# Patient Record
Sex: FEMALE | Race: BLACK OR AFRICAN AMERICAN | URBAN - METROPOLITAN AREA
[De-identification: names, ages, dates, MRNs, and addresses within clinical notes are randomized per-mention and may not be internally consistent; named-entity substitution may affect disease eponyms.]

---

## 2017-01-04 ENCOUNTER — OFFICE VISIT (OUTPATIENT)
Dept: FAMILY MEDICINE CLINIC | Age: 32
End: 2017-01-04

## 2017-01-04 VITALS
SYSTOLIC BLOOD PRESSURE: 110 MMHG | DIASTOLIC BLOOD PRESSURE: 76 MMHG | HEIGHT: 69 IN | HEART RATE: 80 BPM | TEMPERATURE: 97.8 F | WEIGHT: 293 LBS | OXYGEN SATURATION: 97 % | BODY MASS INDEX: 43.4 KG/M2 | RESPIRATION RATE: 12 BRPM

## 2017-01-04 DIAGNOSIS — Z11.1 SCREENING FOR TUBERCULOSIS: ICD-10-CM

## 2017-01-04 DIAGNOSIS — N89.8 VAGINAL DISCHARGE: Primary | ICD-10-CM

## 2017-01-04 DIAGNOSIS — R35.0 URINARY FREQUENCY: ICD-10-CM

## 2017-01-04 PROBLEM — F90.9 ATTENTION DEFICIT HYPERACTIVITY DISORDER (ADHD): Status: ACTIVE | Noted: 2017-01-04

## 2017-01-04 PROBLEM — J45.20 MILD INTERMITTENT ASTHMA WITHOUT COMPLICATION: Status: ACTIVE | Noted: 2017-01-04

## 2017-01-04 PROBLEM — B97.7 HPV IN FEMALE: Status: ACTIVE | Noted: 2017-01-04

## 2017-01-04 PROBLEM — F79 INTELLECTUAL DISABILITY: Status: ACTIVE | Noted: 2017-01-04

## 2017-01-04 LAB
BILIRUB UR QL STRIP: NEGATIVE
GLUCOSE UR-MCNC: NEGATIVE MG/DL
KETONES P FAST UR STRIP-MCNC: NEGATIVE MG/DL
PH UR STRIP: 7 [PH] (ref 4.6–8)
PROT UR QL STRIP: NEGATIVE MG/DL
SP GR UR STRIP: 1.01 (ref 1–1.03)
UA UROBILINOGEN AMB POC: NORMAL (ref 0.2–1)
URINALYSIS CLARITY POC: CLEAR
URINALYSIS COLOR POC: YELLOW
URINE BLOOD POC: NEGATIVE
URINE LEUKOCYTES POC: NEGATIVE
URINE NITRITES POC: NEGATIVE

## 2017-01-04 RX ORDER — CITALOPRAM 40 MG/1
40 TABLET, FILM COATED ORAL DAILY
COMMUNITY

## 2017-01-04 RX ORDER — RISPERIDONE 2 MG/1
TABLET, FILM COATED ORAL
COMMUNITY

## 2017-01-04 RX ORDER — PSEUDOEPHEDRINE HCL 30 MG
TABLET ORAL
COMMUNITY
End: 2017-01-26 | Stop reason: ALTCHOICE

## 2017-01-04 RX ORDER — ACYCLOVIR 400 MG/1
400 TABLET ORAL
COMMUNITY

## 2017-01-04 RX ORDER — ADHESIVE BANDAGE
30 BANDAGE TOPICAL DAILY PRN
COMMUNITY

## 2017-01-04 RX ORDER — POLYETHYLENE GLYCOL 3350
POWDER (GRAM) MISCELLANEOUS
COMMUNITY
End: 2019-09-30 | Stop reason: SDUPTHER

## 2017-01-04 RX ORDER — ONDANSETRON 4 MG/1
4 TABLET, ORALLY DISINTEGRATING ORAL
COMMUNITY

## 2017-01-04 RX ORDER — ACETAMINOPHEN 325 MG/1
TABLET ORAL
COMMUNITY

## 2017-01-04 RX ORDER — DOCUSATE SODIUM 100 MG/1
100 CAPSULE, LIQUID FILLED ORAL 2 TIMES DAILY
COMMUNITY
End: 2018-08-09 | Stop reason: SDUPTHER

## 2017-01-04 NOTE — PROGRESS NOTES
Chief Complaint   Patient presents with   [address] [facility] [address] Injection     she is a 32y.o. year old female who presents for evalution. Pt new to group home. Needs PPD. No coughing, night sweats, weight loss. Having urinary frequency and discharge, vaginal odor. 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 Reviewed PmHx, RxHx, FmHx, SocHx, AllgHx and updated and dated in the chart. Review of Systems - negative except as listed above in the HPI    Objective:     Vitals:    01/04/17 1547   BP: 110/76   Pulse: 80   Resp: 12   Temp: 97.8 °F (36.6 °C)   SpO2: 97%   Weight: 303 lb (137.4 kg)   Height: 5' 9\" (1.753 m)     Physical Examination: General appearance - alert, well appearing, and in no distress  Chest - clear to auscultation, no wheezes, rales or rhonchi, symmetric air entry  Heart - normal rate, regular rhythm, normal S1, S2, no murmurs, rubs, clicks or gallops  Pelvic - VULVA: normal appearing vulva with no masses, tenderness or lesions, VAGINA: normal appearing vagina with normal color and discharge, no lesions, malodor present  External exam only     Assessment/ Plan:   Corby Guaman was seen today for establish care and injection. Diagnoses and all orders for this visit:    Vaginal discharge  -     Cherie Debra  Will decide on F/U after reviewing labs. Urinary frequency  -     CULTURE, URINE  -     AMB POC URINALYSIS DIP STICK AUTO W/ MICRO  No signs of infection on dipstick, will send out for culture. Screening for tuberculosis  -     AMB POC TUBERCULOSIS, INTRADERMAL (SKIN TEST)    Pt voiced understanding regarding plan of care. Follow-up Disposition:  Return in about 2 days (around 1/6/2017) for PPD reading . I have discussed the diagnosis with the patient and the intended plan as seen in the above orders. The patient has received an after-visit summary and questions were answered concerning future plans.      Medication Side Effects and Warnings were discussed with patient    Yoly Fischer NP

## 2017-01-04 NOTE — MR AVS SNAPSHOT
Visit Information Date & Time Provider Department Dept. Phone Encounter #  
 1/4/2017  3:30 PM Arnulfo Gastelum NP [address] [facility] [phone] [id number] Follow-up Instructions Return in about 2 days (around 1/6/2017) for PPD reading . Upcoming Health Maintenance Date Due DTaP/Tdap/Td series (1 - Tdap) 4/28/2006 PAP AKA CERVICAL CYTOLOGY 4/28/2006 INFLUENZA AGE 9 TO ADULT 8/1/2016 Allergies as of 1/4/2017  Review Complete On: 1/4/2017 By: Daisy Taveras LPN Severity Noted Reaction Type Reactions Tomato  01/04/2017    Rash Per pt report Current Immunizations  Never Reviewed Name Date  
 TB Skin Test (PPD) Intradermal  Incomplete Not reviewed this visit You Were Diagnosed With   
  
 Codes Comments Vaginal discharge    -  Primary ICD-10-CM: N89.8 ICD-9-CM: 623.5 Urinary frequency     ICD-10-CM: R35.0 ICD-9-CM: 788.41 Screening for tuberculosis     ICD-10-CM: Z11.1 ICD-9-CM: V74.1 Vitals BP Pulse Temp Resp Height(growth percentile) Weight(growth percentile) 110/76 80 97.8 °F (36.6 °C) 12 5' 9\" (1.753 m) 303 lb (137.4 kg) SpO2 BMI OB Status Smoking Status 97% 44.75 kg/m2 Unknown Never Smoker Vitals History BMI and BSA Data Body Mass Index Body Surface Area 44.75 kg/m 2 2.59 m 2 Preferred Pharmacy Pharmacy Name Phone [facility] [phone] Your Updated Medication List  
  
   
This list is accurate as of: 1/4/17  4:16 PM.  Always use your most recent med list.  
  
  
  
  
 acyclovir 400 mg tablet Commonly known as:  ZOVIRAX Take 400 mg by mouth every four (4) hours (while awake). citalopram 40 mg tablet Commonly known as:  Milton Mao Take 40 mg by mouth daily. COLACE 100 mg capsule Generic drug:  docusate sodium Take 100 mg by mouth two (2) times a day.   
  
 IMODIUM PO  
 Take  by mouth.  
  
 loratadine 10 mg Cap Take  by mouth. norethindrone-ethinyl estradiol 1-35 mg-mcg Tab Commonly known as:  ORTHO-NOVUM 1-35 TAB, NORTREL 1-35 TAB Take  by mouth. ondansetron 4 mg disintegrating tablet Commonly known as:  ZOFRAN ODT Take 4 mg by mouth every eight (8) hours as needed for Nausea. HIGUERA MILK OF MAGNESIA 400 mg/5 mL suspension Generic drug:  magnesium hydroxide Take 30 mL by mouth daily as needed for Constipation. Polyethylene Glycol 3350 Powd  
by Does Not Apply route. RisperDAL 2 mg tablet Generic drug:  risperiDONE Take  by mouth. ROBITUSSIN COLD & COUGH PO Take  by mouth. SUDAFED 30 mg tablet Generic drug:  pseudoephedrine Take  by mouth every four (4) hours as needed for Congestion. THERAFLU COLD & SORE THROAT PO Take  by mouth. TYLENOL 325 mg tablet Generic drug:  acetaminophen Take  by mouth every four (4) hours as needed for Pain. Follow-up Instructions Return in about 2 days (around 1/6/2017) for PPD reading . Introducing [facility] & HEALTH SERVICES! [facility] introduces ExtraFootie patient portal. Now you can access parts of your medical record, email your doctor's office, and request medication refills online. 1. In your internet browser, go to [email or web address] 2. Click on the First Time User? Click Here link in the Sign In box. You will see the New Member Sign Up page. 3. Enter your ExtraFootie Access Code exactly as it appears below. You will not need to use this code after youve completed the sign-up process. If you do not sign up before the expiration date, you must request a new code. · ExtraFootie Access Code: [email or web address] Expires: 4/4/2017  4:16 PM 
 
4. Enter the last four digits of your Social Security Number (xxxx) and Date of Birth (mm/dd/yyyy) as indicated and click Submit. You will be taken to the next sign-up page. 5. Create a Sifteo ID. This will be your Sifteo login ID and cannot be changed, so think of one that is secure and easy to remember. 6. Create a Sifteo password. You can change your password at any time. 7. Enter your Password Reset Question and Answer. This can be used at a later time if you forget your password. 8. Enter your e-mail address. You will receive e-mail notification when new information is available in [address] 9. Click Sign Up. You can now view and download portions of your medical record. 10. Click the Download Summary menu link to download a portable copy of your medical information. If you have questions, please visit the Frequently Asked Questions section of the Sifteo website. Remember, Sifteo is NOT to be used for urgent needs. For medical emergencies, dial 911. Now available from your iPhone and Android! Please provide this summary of care documentation to your next provider. If you have any questions after today's visit, please call [phone].

## 2017-01-06 LAB
A VAGINAE DNA VAG QL NAA+PROBE: NORMAL SCORE
BACTERIA UR CULT: NORMAL
BVAB2 DNA VAG QL NAA+PROBE: NORMAL SCORE
C ALBICANS DNA VAG QL NAA+PROBE: NEGATIVE
C GLABRATA DNA VAG QL NAA+PROBE: NEGATIVE
MEGA1 DNA VAG QL NAA+PROBE: NORMAL SCORE
MM INDURATION POC: 0 MM (ref 0–5)
PPD POC: NEGATIVE NEGATIVE
T VAGINALIS RRNA SPEC QL NAA+PROBE: NEGATIVE

## 2017-01-26 ENCOUNTER — OFFICE VISIT (OUTPATIENT)
Dept: FAMILY MEDICINE CLINIC | Age: 32
End: 2017-01-26

## 2017-01-26 VITALS
BODY MASS INDEX: 43.4 KG/M2 | OXYGEN SATURATION: 100 % | RESPIRATION RATE: 16 BRPM | TEMPERATURE: 98 F | DIASTOLIC BLOOD PRESSURE: 66 MMHG | HEART RATE: 68 BPM | WEIGHT: 293 LBS | SYSTOLIC BLOOD PRESSURE: 102 MMHG | HEIGHT: 69 IN

## 2017-01-26 DIAGNOSIS — R30.0 DYSURIA: ICD-10-CM

## 2017-01-26 DIAGNOSIS — R63.5 WEIGHT GAIN: ICD-10-CM

## 2017-01-26 DIAGNOSIS — F90.9 ATTENTION DEFICIT HYPERACTIVITY DISORDER (ADHD), UNSPECIFIED ADHD TYPE: ICD-10-CM

## 2017-01-26 DIAGNOSIS — Z51.81 MEDICATION MONITORING ENCOUNTER: ICD-10-CM

## 2017-01-26 DIAGNOSIS — F79 INTELLECTUAL DISABILITY: Primary | ICD-10-CM

## 2017-01-26 DIAGNOSIS — Z11.1 SCREENING-PULMONARY TB: ICD-10-CM

## 2017-01-26 DIAGNOSIS — E78.00 HYPERCHOLESTEREMIA: ICD-10-CM

## 2017-01-26 LAB
BILIRUB UR QL STRIP: NEGATIVE
GLUCOSE UR-MCNC: NEGATIVE MG/DL
KETONES P FAST UR STRIP-MCNC: NEGATIVE MG/DL
PH UR STRIP: 6 [PH] (ref 4.6–8)
PROT UR QL STRIP: NEGATIVE MG/DL
SP GR UR STRIP: 1.01 (ref 1–1.03)
UA UROBILINOGEN AMB POC: NORMAL (ref 0.2–1)
URINALYSIS CLARITY POC: CLEAR
URINALYSIS COLOR POC: YELLOW
URINE BLOOD POC: NEGATIVE
URINE LEUKOCYTES POC: NEGATIVE
URINE NITRITES POC: NEGATIVE

## 2017-01-26 RX ORDER — FLUTICASONE PROPIONATE 50 MCG
2 SPRAY, SUSPENSION (ML) NASAL DAILY
COMMUNITY
End: 2018-09-13 | Stop reason: SDUPTHER

## 2017-01-26 NOTE — PROGRESS NOTES
1. Have you been to the ER, urgent care clinic since your last visit? Hospitalized since your last visit? No    2. Have you seen or consulted any other health care providers outside of the [address] [place] [address] since your last visit? Include any pap smears or colon screening. No    Chief Complaint   Patient presents with    Complete Physical    Labs     fasting    Lesion     left hip x 1 mo     Pt states she has a lesion on left hip x 1 month.

## 2017-01-26 NOTE — MR AVS SNAPSHOT
Visit Information Date & Time Provider Department Dept. Phone Encounter #  
 1/26/2017 10:00 AM Eva Hannah, TAM [address] [facility] [phone] [id number] Upcoming Health Maintenance Date Due Pneumococcal 19-64 Medium Risk (1 of 1 - PPSV23) 4/28/2004 DTaP/Tdap/Td series (1 - Tdap) 4/28/2006 PAP AKA CERVICAL CYTOLOGY 4/28/2006 INFLUENZA AGE 9 TO ADULT 8/1/2016 Allergies as of 1/26/2017  Review Complete On: 1/26/2017 By: Tamiko Sue LPN Severity Noted Reaction Type Reactions Tomato  01/04/2017    Rash Per pt report Current Immunizations  Reviewed on 1/4/2017 Name Date  
 TB Skin Test (PPD) Intradermal 1/4/2017 Not reviewed this visit You Were Diagnosed With   
  
 Codes Comments Intellectual disability    -  Primary ICD-10-CM: F79 
ICD-9-CM: 372 Attention deficit hyperactivity disorder (ADHD), unspecified ADHD type     ICD-10-CM: F90.9 ICD-9-CM: 314.01 Weight gain     ICD-10-CM: R63.5 ICD-9-CM: 783.1 Dysuria     ICD-10-CM: R30.0 ICD-9-CM: 788.1 Medication monitoring encounter     ICD-10-CM: Z51.81 
ICD-9-CM: V58.83 Hypercholesteremia     ICD-10-CM: E78.00 ICD-9-CM: 272.0 Screening-pulmonary TB     ICD-10-CM: Z11.1 ICD-9-CM: V74.1 Vitals BP Pulse Temp Resp Height(growth percentile) Weight(growth percentile) 102/66 68 98 °F (36.7 °C) (Oral) 16 5' 9\" (1.753 m) 306 lb 4 oz (138.9 kg) SpO2 BMI OB Status Smoking Status 100% 45.23 kg/m2 Unknown Never Smoker Vitals History BMI and BSA Data Body Mass Index Body Surface Area  
 45.23 kg/m 2 2.6 m 2 Preferred Pharmacy Pharmacy Name Phone [facility] [phone] Your Updated Medication List  
  
   
This list is accurate as of: 1/26/17 10:48 AM.  Always use your most recent med list.  
  
  
  
  
 acyclovir 400 mg tablet Commonly known as:  ZOVIRAX Take 400 mg by mouth every four (4) hours (while awake). citalopram 40 mg tablet Commonly known as:  Valeria Doan Take 40 mg by mouth daily. COLACE 100 mg capsule Generic drug:  docusate sodium Take 100 mg by mouth two (2) times a day. fluticasone 50 mcg/actuation nasal spray Commonly known as:  Shelvia Birks 2 Sprays by Both Nostrils route daily. loratadine 10 mg Cap Take  by mouth. norethindrone-ethinyl estradiol 1-35 mg-mcg Tab Commonly known as:  ORTHO-NOVUM 1-35 TAB, NORTREL 1-35 TAB Take  by mouth. ondansetron 4 mg disintegrating tablet Commonly known as:  ZOFRAN ODT Take 4 mg by mouth every eight (8) hours as needed for Nausea. HIGUERA MILK OF MAGNESIA 400 mg/5 mL suspension Generic drug:  magnesium hydroxide Take 30 mL by mouth daily as needed for Constipation. Polyethylene Glycol 3350 Powd  
by Does Not Apply route. RisperDAL 2 mg tablet Generic drug:  risperiDONE Take  by mouth. TYLENOL 325 mg tablet Generic drug:  acetaminophen Take  by mouth every four (4) hours as needed for Pain. We Performed the Following AMB POC URINALYSIS DIP STICK AUTO W/O MICRO [41903 CPT(R)] CBC WITH AUTOMATED DIFF [80947 CPT(R)] HEMOGLOBIN A1C WITH EAG [75505 CPT(R)] LIPID PANEL [29623 CPT(R)] METABOLIC PANEL, COMPREHENSIVE [73635 CPT(R)] QUANTIFERON TB GOLD [LKC79034 Custom] TSH 3RD GENERATION [57320 CPT(R)] Introducing [facility] & HE[facility] SERVICES! Danny Leiva introduces C-Note patient portal. Now you can access parts of your medical record, email your doctor's office, and request medication refills online. 1. In your internet browser, go to [email or web address] 2. Click on the First Time User? Click Here link in the Sign In box. You will see the New Member Sign Up page. 3. Enter your C-Note Access Code exactly as it appears below.  You will not need to use this code after youve completed the sign-up process. If you do not sign up before the expiration date, you must request a new code. · Voxbone Access Code: [email or web address] Expires: 4/4/2017  4:16 PM 
 
4. Enter the last four digits of your Social Security Number (xxxx) and Date of Birth (mm/dd/yyyy) as indicated and click Submit. You will be taken to the next sign-up page. 5. Create a Voxbone ID. This will be your Voxbone login ID and cannot be changed, so think of one that is secure and easy to remember. 6. Create a Voxbone password. You can change your password at any time. 7. Enter your Password Reset Question and Answer. This can be used at a later time if you forget your password. 8. Enter your e-mail address. You will receive e-mail notification when new information is available in [address] 9. Click Sign Up. You can now view and download portions of your medical record. 10. Click the Download Summary menu link to download a portable copy of your medical information. If you have questions, please visit the Frequently Asked Questions section of the Voxbone website. Remember, Voxbone is NOT to be used for urgent needs. For medical emergencies, dial 911. Now available from your iPhone and Android! Please provide this summary of care documentation to your next provider. Your primary care clinician is listed as [address]. If you have any questions after today's visit, please call [phone].

## 2017-01-26 NOTE — PROGRESS NOTES
HISTORY OF PRESENT ILLNESS  Mic Javier is a 32 y.o. female. HPI  Patient here for group home annual ppd screening  Concerned that her sugar may be high, freq urination without burning and obese  She is following healthy diet at the group home but sneaking snacks  No refills needed at this time  Has dry skin patch of the left hip area that itches    ROS  A comprehensive review of system was obtained and negative except findings in the HPI    Visit Vitals    /66    Pulse 68    Temp 98 °F (36.7 °C) (Oral)    Resp 16    Ht 5' 9\" (1.753 m)    Wt 306 lb 4 oz (138.9 kg)    SpO2 100%    BMI 45.23 kg/m2     Physical Exam   Constitutional: She is oriented to person, place, and time. She appears well-developed and well-nourished. Neck: No JVD present. Cardiovascular: Normal rate, regular rhythm and intact distal pulses. Exam reveals no gallop and no friction rub. No murmur heard. Pulmonary/Chest: Effort normal and breath sounds normal. No respiratory distress. She has no wheezes. Musculoskeletal: She exhibits no edema. Neurological: She is alert and oriented to person, place, and time. Skin: Skin is warm. Nursing note and vitals reviewed. ASSESSMENT and PLAN  Encounter Diagnoses   Name Primary?  Intellectual disability Yes    Attention deficit hyperactivity disorder (ADHD), unspecified ADHD type     Weight gain     Dysuria     Medication monitoring encounter     Hypercholesteremia     Screening-pulmonary TB      Orders Placed This Encounter    QUANTIFERON TB GOLD    CBC WITH AUTOMATED DIFF    HEMOGLOBIN A1C WITH EAG    LIPID PANEL    METABOLIC PANEL, COMPREHENSIVE    TSH 3RD GENERATION    AMB POC URINALYSIS DIP STICK AUTO W/O MICRO    fluticasone (FLONASE) 50 mcg/actuation nasal spray     Urine dip clean  No change in meds at this time  Labs updated as well  I have discussed the diagnosis with the patient and the intended plan as seen in the above orders.  The patient has received an after-visit summary and questions were answered concerning future plans. Patient conveyed understanding of the plan at the time of the visit.     Yola Whitaker MSN, ANP  1/26/2017

## 2017-01-27 LAB
ALBUMIN SERPL-MCNC: 3.4 G/DL (ref 3.5–5.5)
ALBUMIN/GLOB SERPL: 1.1 {RATIO} (ref 1.1–2.5)
ALP SERPL-CCNC: 73 IU/L (ref 39–117)
ALT SERPL-CCNC: 13 IU/L (ref 0–32)
AST SERPL-CCNC: 19 IU/L (ref 0–40)
BASOPHILS # BLD AUTO: 0 X10E3/UL (ref 0–0.2)
BASOPHILS NFR BLD AUTO: 0 %
BILIRUB SERPL-MCNC: <0.2 MG/DL (ref 0–1.2)
BUN SERPL-MCNC: 13 MG/DL (ref 6–20)
BUN/CREAT SERPL: 16 (ref 8–20)
CALCIUM SERPL-MCNC: 8.8 MG/DL (ref 8.7–10.2)
CHLORIDE SERPL-SCNC: 101 MMOL/L (ref 96–106)
CHOLEST SERPL-MCNC: 161 MG/DL (ref 100–199)
CO2 SERPL-SCNC: 26 MMOL/L (ref 18–29)
CREAT SERPL-MCNC: 0.83 MG/DL (ref 0.57–1)
EOSINOPHIL # BLD AUTO: 0.2 X10E3/UL (ref 0–0.4)
EOSINOPHIL NFR BLD AUTO: 2 %
ERYTHROCYTE [DISTWIDTH] IN BLOOD BY AUTOMATED COUNT: 14.1 % (ref 12.3–15.4)
EST. AVERAGE GLUCOSE BLD GHB EST-MCNC: 114 MG/DL
GLOBULIN SER CALC-MCNC: 3.1 G/DL (ref 1.5–4.5)
GLUCOSE SERPL-MCNC: 69 MG/DL (ref 65–99)
HBA1C MFR BLD: 5.6 % (ref 4.8–5.6)
HCT VFR BLD AUTO: 35.7 % (ref 34–46.6)
HDLC SERPL-MCNC: 66 MG/DL
HGB BLD-MCNC: 11.7 G/DL (ref 11.1–15.9)
IMM GRANULOCYTES # BLD: 0 X10E3/UL (ref 0–0.1)
IMM GRANULOCYTES NFR BLD: 0 %
INTERPRETATION, 910389: NORMAL
LDLC SERPL CALC-MCNC: 81 MG/DL (ref 0–99)
LYMPHOCYTES # BLD AUTO: 2.7 X10E3/UL (ref 0.7–3.1)
LYMPHOCYTES NFR BLD AUTO: 26 %
MCH RBC QN AUTO: 28.9 PG (ref 26.6–33)
MCHC RBC AUTO-ENTMCNC: 32.8 G/DL (ref 31.5–35.7)
MCV RBC AUTO: 88 FL (ref 79–97)
MONOCYTES # BLD AUTO: 0.8 X10E3/UL (ref 0.1–0.9)
MONOCYTES NFR BLD AUTO: 8 %
NEUTROPHILS # BLD AUTO: 6.6 X10E3/UL (ref 1.4–7)
NEUTROPHILS NFR BLD AUTO: 64 %
PLATELET # BLD AUTO: 289 X10E3/UL (ref 150–379)
POTASSIUM SERPL-SCNC: 4.3 MMOL/L (ref 3.5–5.2)
PROT SERPL-MCNC: 6.5 G/DL (ref 6–8.5)
RBC # BLD AUTO: 4.05 X10E6/UL (ref 3.77–5.28)
SODIUM SERPL-SCNC: 137 MMOL/L (ref 134–144)
TRIGL SERPL-MCNC: 72 MG/DL (ref 0–149)
TSH SERPL DL<=0.005 MIU/L-ACNC: 2.67 UIU/ML (ref 0.45–4.5)
VLDLC SERPL CALC-MCNC: 14 MG/DL (ref 5–40)
WBC # BLD AUTO: 10.3 X10E3/UL (ref 3.4–10.8)

## 2017-01-27 NOTE — PROGRESS NOTES
RECOMMENDATIONS:  None. Keep up the good work! Work on diet and exercise. Recheck this test: 12 months.

## 2017-01-31 LAB
ANNOTATION COMMENT IMP: NORMAL
GAMMA INTERFERON BACKGROUND BLD IA-ACNC: 0.08 IU/ML
M TB IFN-G BLD-IMP: NEGATIVE
M TB IFN-G CD4+ BCKGRND COR BLD-ACNC: 0.23 IU/ML
M TB IFN-G CD4+ T-CELLS BLD-ACNC: 0.31 IU/ML
MITOGEN IGNF BLD-ACNC: >10 IU/ML
QUANTIFERON INCUBATION: NORMAL
SERVICE CMNT-IMP: NORMAL

## 2017-02-09 ENCOUNTER — OFFICE VISIT (OUTPATIENT)
Dept: FAMILY MEDICINE CLINIC | Age: 32
End: 2017-02-09

## 2017-02-09 VITALS
DIASTOLIC BLOOD PRESSURE: 78 MMHG | BODY MASS INDEX: 43.4 KG/M2 | HEART RATE: 65 BPM | WEIGHT: 293 LBS | OXYGEN SATURATION: 98 % | HEIGHT: 69 IN | TEMPERATURE: 98.1 F | SYSTOLIC BLOOD PRESSURE: 124 MMHG | RESPIRATION RATE: 18 BRPM

## 2017-02-09 DIAGNOSIS — B37.2 YEAST INFECTION OF THE SKIN: ICD-10-CM

## 2017-02-09 DIAGNOSIS — S09.90XA HEAD INJURY DUE TO TRAUMA, INITIAL ENCOUNTER: Primary | ICD-10-CM

## 2017-02-09 RX ORDER — IBUPROFEN 600 MG/1
600 TABLET ORAL
Qty: 30 TAB | Refills: 1 | Status: SHIPPED | OUTPATIENT
Start: 2017-02-09 | End: 2018-03-08 | Stop reason: SDUPTHER

## 2017-02-09 RX ORDER — NYSTATIN 100000 [USP'U]/G
POWDER TOPICAL
Qty: 60 G | Refills: 3 | Status: SHIPPED | OUTPATIENT
Start: 2017-02-09 | End: 2019-02-18 | Stop reason: ALTCHOICE
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
## 2017-04-25 ENCOUNTER — OFFICE VISIT (OUTPATIENT)
Dept: FAMILY MEDICINE CLINIC | Age: 32
End: 2017-04-25

## 2017-04-25 VITALS
WEIGHT: 293 LBS | BODY MASS INDEX: 43.4 KG/M2 | HEART RATE: 80 BPM | DIASTOLIC BLOOD PRESSURE: 80 MMHG | TEMPERATURE: 98.2 F | HEIGHT: 69 IN | RESPIRATION RATE: 18 BRPM | OXYGEN SATURATION: 99 % | SYSTOLIC BLOOD PRESSURE: 125 MMHG

## 2017-04-25 DIAGNOSIS — B37.2 YEAST INFECTION OF THE SKIN: Primary | ICD-10-CM

## 2017-04-25 RX ORDER — FLUCONAZOLE 150 MG/1
150 TABLET ORAL DAILY
Qty: 2 TAB | Refills: 0 | Status: SHIPPED | OUTPATIENT
Start: 2017-04-25 | End: 2017-05-25 | Stop reason: ALTCHOICE

## 2017-04-25 NOTE — PROGRESS NOTES
Pt here with group home counselors c/c rash on lower abdomen x 1 week. Pt reports having itching associated with rash. Counselor states that pt has been given Ibuprofen and Acyclovir. Subjective: (As above and below)     Chief Complaint   Patient presents with    Rash     she is a 32y.o. year old female who presents for evaluation. Reviewed PmHx, RxHx, FmHx, SocHx, AllgHx and updated in chart. Review of Systems - negative except as listed above    Objective:     Vitals:    04/25/17 1335   BP: 125/80   Pulse: 80   Resp: 18   Temp: 98.2 °F (36.8 °C)   TempSrc: Oral   SpO2: 99%   Weight: 325 lb (147.4 kg)   Height: 5' 9\" (1.753 m)     Physical Examination: General appearance - alert, well appearing, and in no distress  Mental status - normal mood, behavior, speech, dress, motor activity, and thought processes  Mouth - mucous membranes moist, pharynx normal without lesions  Chest - clear to auscultation, no wheezes, rales or rhonchi, symmetric air entry  Heart - normal rate, regular rhythm, normal S1, S2, no murmurs, rubs, clicks or gallops  Skin - shiny erythematous skin under lower abdominal fold, yeast odor    Assessment/ Plan:   1. Yeast infection of the skin  Orders Placed This Encounter    fluconazole (DIFLUCAN) 150 mg tablet     Sig: Take 1 Tab by mouth daily. Repeat in 3 days if needed. Dispense:  2 Tab     Refill:  0   nystatin powder to affected area     Follow-up Disposition: As needed  I have discussed the diagnosis with the patient and the intended plan as seen in the above orders. The patient has received an after-visit summary and questions were answered concerning future plans.      Medication Side Effects and Warnings were discussed with patient: yes  Patient Labs were reviewed: yes  Patient Past Records were reviewed:  yes    Kalia Trammell M.D.

## 2017-04-25 NOTE — PROGRESS NOTES
Pt here with group home counselors c/c rash on lower abdomen x 1 week. Pt reports having itching associated with rash. Counselor states that pt has been given Ibuprofen and Acyclovir.

## 2017-04-25 NOTE — MR AVS SNAPSHOT
Visit Information Date & Time Provider Department Dept. Phone Encounter #  
 4/25/2017  1:30 PM Terrence Bell MD [address] [facility] [phone] [id number] Upcoming Health Maintenance Date Due Pneumococcal 19-64 Medium Risk (1 of 1 - PPSV23) 4/28/2004 DTaP/Tdap/Td series (1 - Tdap) 4/28/2006 PAP AKA CERVICAL CYTOLOGY 4/28/2006 INFLUENZA AGE 9 TO ADULT 8/1/2016 Allergies as of 4/25/2017  Review Complete On: 4/25/2017 By: Terrence Bell MD  
  
 Severity Noted Reaction Type Reactions Tomato  01/04/2017    Rash Per pt report Current Immunizations  Reviewed on 1/4/2017 Name Date  
 TB Skin Test (PPD) Intradermal 1/4/2017 Not reviewed this visit You Were Diagnosed With   
  
 Codes Comments Yeast infection of the skin    -  Primary ICD-10-CM: B37.2 ICD-9-CM: 112.3 Vitals BP Pulse Temp Resp Height(growth percentile) Weight(growth percentile) 125/80 (BP 1 Location: Left arm, BP Patient Position: Sitting) 80 98.2 °F (36.8 °C) (Oral) 18 5' 9\" (1.753 m) 325 lb (147.4 kg) SpO2 BMI OB Status Smoking Status 99% 47.99 kg/m2 Unknown Never Smoker Vitals History BMI and BSA Data Body Mass Index Body Surface Area  
 47.99 kg/m 2 2.68 m 2 Preferred Pharmacy Pharmacy Name Phone [facility] [phone] Your Updated Medication List  
  
   
This list is accurate as of: 4/25/17  1:50 PM.  Always use your most recent med list.  
  
  
  
  
 acyclovir 400 mg tablet Commonly known as:  ZOVIRAX Take 400 mg by mouth every four (4) hours (while awake). citalopram 40 mg tablet Commonly known as:  Rico Hatch Take 40 mg by mouth daily. COLACE 100 mg capsule Generic drug:  docusate sodium Take 100 mg by mouth two (2) times a day. fluconazole 150 mg tablet Commonly known as:  DIFLUCAN  
 Take 1 Tab by mouth daily. Repeat in 3 days if needed. fluticasone 50 mcg/actuation nasal spray Commonly known as:  Jay Bumpers 2 Sprays by Both Nostrils route daily. ibuprofen 600 mg tablet Commonly known as:  MOTRIN Take 1 Tab by mouth every eight (8) hours as needed for Pain.  
  
 loratadine 10 mg Cap Take  by mouth. norethindrone-ethinyl estradiol 1-35 mg-mcg Tab Commonly known as:  ORTHO-NOVUM 1-35 TAB, NORTREL 1-35 TAB Take 1 Tab by mouth daily. nystatin powder Commonly known as:  MYCOSTATIN Apply  to affected area three (3) times daily as needed. ondansetron 4 mg disintegrating tablet Commonly known as:  ZOFRAN ODT Take 4 mg by mouth every eight (8) hours as needed for Nausea. HIGUERA MILK OF MAGNESIA 400 mg/5 mL suspension Generic drug:  magnesium hydroxide Take 30 mL by mouth daily as needed for Constipation. Polyethylene Glycol 3350 Powd  
by Does Not Apply route. RisperDAL 2 mg tablet Generic drug:  risperiDONE Take  by mouth. TYLENOL 325 mg tablet Generic drug:  acetaminophen Take  by mouth every four (4) hours as needed for Pain. Prescriptions Sent to Pharmacy Refills  
 fluconazole (DIFLUCAN) 150 mg tablet 0 Sig: Take 1 Tab by mouth daily. Repeat in 3 days if needed. Class: Normal  
 Pharmacy: [address] [place] [address] Ph #: [phone] Route: Oral  
  
Introducing [facility]! Cindy Zhang introduces [employer] patient portal. Now you can access parts of your medical record, email your doctor's office, and request medication refills online. 1. In your internet browser, go to [email or web address] 2. Click on the First Time User? Click Here link in the Sign In box. You will see the New Member Sign Up page. 3. Enter your [employer] Access Code exactly as it appears below.  You will not need to use this code after youve completed the sign-up process. If you do not sign up before the expiration date, you must request a new code. · Nemedia Access Code: [email or web address] Expires: 7/24/2017  1:50 PM 
 
4. Enter the last four digits of your Social Security Number (xxxx) and Date of Birth (mm/dd/yyyy) as indicated and click Submit. You will be taken to the next sign-up page. 5. Create a Nemedia ID. This will be your Nemedia login ID and cannot be changed, so think of one that is secure and easy to remember. 6. Create a Nemedia password. You can change your password at any time. 7. Enter your Password Reset Question and Answer. This can be used at a later time if you forget your password. 8. Enter your e-mail address. You will receive e-mail notification when new information is available in [address] 9. Click Sign Up. You can now view and download portions of your medical record. 10. Click the Download Summary menu link to download a portable copy of your medical information. If you have questions, please visit the Frequently Asked Questions section of the Nem[employer] website. Remember, Nemedia is NOT to be used for urgent needs. For medical emergencies, dial 911. Now available from your iPhone and Android! Please provide this summary of care documentation to your next provider. Your primary care clinician is listed as Maria Luz Saleem. If you have any questions after today's visit, please call [phone].

## 2017-05-25 ENCOUNTER — OFFICE VISIT (OUTPATIENT)
Dept: FAMILY MEDICINE CLINIC | Age: 32
End: 2017-05-25

## 2017-05-25 VITALS
OXYGEN SATURATION: 96 % | TEMPERATURE: 98.3 F | DIASTOLIC BLOOD PRESSURE: 59 MMHG | BODY MASS INDEX: 43.4 KG/M2 | SYSTOLIC BLOOD PRESSURE: 100 MMHG | HEIGHT: 69 IN | HEART RATE: 73 BPM | RESPIRATION RATE: 21 BRPM | WEIGHT: 293 LBS

## 2017-05-25 DIAGNOSIS — R51.9 FREQUENT HEADACHES: Primary | ICD-10-CM

## 2017-05-25 DIAGNOSIS — Z91.14 OVERUSE OF MEDICATION: ICD-10-CM

## 2017-05-25 DIAGNOSIS — T39.314A: ICD-10-CM

## 2017-05-25 NOTE — PROGRESS NOTES
Chief Complaint   Patient presents with    Drug Overdose     Motrin     Patient seen in office today with group home staff today with concerns of the patients overdose of motrin. Staff states patient went on her own and purchased several bottles, as well as pills consumed from [employer] at the day program and group home facility. All bottles were empty when found.

## 2017-05-25 NOTE — PROGRESS NOTES
Chief Complaint   Patient presents with    Drug Overdose     Motrin     Patient seen in office today with group home staff today with concerns of the patients overdose of motrin. Staff states patient went on her own and purchased several bottles, as well as pills consumed from [employer] at the day program and group home facility. All bottles were empty when found. This has been taking place over several weeks. Subjective: (As above and below)     Chief Complaint   Patient presents with    Drug Overdose     Motrin     she is a 28y.o. year old female who presents for evaluation. Reviewed PmHx, RxHx, FmHx, SocHx, AllgHx and updated in chart. Review of Systems - negative except as listed above    Objective:     Vitals:    05/25/17 1043   BP: 100/59   Pulse: 73   Resp: 21   Temp: 98.3 °F (36.8 °C)   TempSrc: Oral   SpO2: 96%   Weight: 332 lb 8 oz (150.8 kg)   Height: 5' 9\" (1.753 m)     Physical Examination: General appearance - alert, well appearing, and in no distress  Mental status - normal mood, behavior, speech, dress, motor activity, and thought processes  Eyes - pupils equal and reactive, extraocular eye movements intact  Mouth - mucous membranes moist, pharynx normal without lesions  Chest - clear to auscultation, no wheezes, rales or rhonchi, symmetric air entry  Heart - normal rate, regular rhythm, normal S1, S2, no murmurs, rubs, clicks or gallops  Musculoskeletal - no joint tenderness, deformity or swelling  Extremities - peripheral pulses normal, no pedal edema, no clubbing or cyanosis  Neuro - Intact    Assessment/ Plan:   1. Frequent headaches  refer for eval  -lots of possible causes including medication overuse, large caffeine and salt intake  - REFERRAL TO NEUROLOGY    2. Motrin overdose, undetermined intent, initial encounter  -check labs  - CBC WITH AUTOMATED DIFF  - METABOLIC PANEL, COMPREHENSIVE  - C286 IBUPROFEN    3.  Overuse of medication  -check labs  - ACETAMINOPHEN    Pt is also meeting with a behavioral counselor     Follow-up Disposition: As needed  I have discussed the diagnosis with the patient and the intended plan as seen in the above orders. The patient has received an after-visit summary and questions were answered concerning future plans.      Medication Side Effects and Warnings were discussed with patient: yes  Patient Labs were reviewed: yes  Patient Past Records were reviewed:  yes    Rica Camejo M.D.

## 2017-05-25 NOTE — MR AVS SNAPSHOT
Visit Information Date & Time Provider Department Dept. Phone Encounter #  
 5/25/2017 10:30 AM Conchis Mccain MD [address] [facility] [phone] [id number] Upcoming Health Maintenance Date Due Pneumococcal 19-64 Medium Risk (1 of 1 - PPSV23) 4/28/2004 DTaP/Tdap/Td series (1 - Tdap) 4/28/2006 PAP AKA CERVICAL CYTOLOGY 4/28/2006 INFLUENZA AGE 9 TO ADULT 8/1/2017 Allergies as of 5/25/2017  Review Complete On: 5/25/2017 By: Conchis Mccain MD  
  
 Severity Noted Reaction Type Reactions Tomato  01/04/2017    Rash Per pt report Current Immunizations  Reviewed on 1/4/2017 Name Date  
 TB Skin Test (PPD) Intradermal 1/4/2017 Not reviewed this visit You Were Diagnosed With   
  
 Codes Comments Frequent headaches    -  Primary ICD-10-CM: G42 ICD-9-CM: 784.0 Motrin overdose, undetermined intent, initial encounter     ICD-10-CM: T39.314A ICD-9-CM: 965.61, E980.0 Overuse of medication     ICD-10-CM: Z91.14 
ICD-9-CM: V15.81 Vitals BP Pulse Temp Resp Height(growth percentile) Weight(growth percentile) 100/59 (BP 1 Location: Right arm, BP Patient Position: Sitting) 73 98.3 °F (36.8 °C) (Oral) 21 5' 9\" (1.753 m) 332 lb 8 oz (150.8 kg) SpO2 BMI OB Status Smoking Status 96% 49.1 kg/m2 Unknown Never Smoker Vitals History BMI and BSA Data Body Mass Index Body Surface Area  
 49.1 kg/m 2 2.71 m 2 Preferred Pharmacy Pharmacy Name Phone [facility] [phone] Your Updated Medication List  
  
   
This list is accurate as of: 5/25/17 11:32 AM.  Always use your most recent med list.  
  
  
  
  
 acyclovir 400 mg tablet Commonly known as:  ZOVIRAX Take 400 mg by mouth every four (4) hours (while awake). citalopram 40 mg tablet Commonly known as:  Zayra Guess Take 40 mg by mouth daily. COLACE 100 mg capsule Generic drug:  docusate sodium Take 100 mg by mouth two (2) times a day. fluticasone 50 mcg/actuation nasal spray Commonly known as:  Kim Tiffanie 2 Sprays by Both Nostrils route daily. ibuprofen 600 mg tablet Commonly known as:  MOTRIN Take 1 Tab by mouth every eight (8) hours as needed for Pain.  
  
 loratadine 10 mg Cap Take  by mouth. norethindrone-ethinyl estradiol 1-35 mg-mcg Tab Commonly known as:  ORTHO-NOVUM 1-35 TAB, NORTREL 1-35 TAB Take 1 Tab by mouth daily. nystatin powder Commonly known as:  MYCOSTATIN Apply  to affected area three (3) times daily as needed. ondansetron 4 mg disintegrating tablet Commonly known as:  ZOFRAN ODT Take 4 mg by mouth every eight (8) hours as needed for Nausea. HIGUERA MILK OF MAGNESIA 400 mg/5 mL suspension Generic drug:  magnesium hydroxide Take 30 mL by mouth daily as needed for Constipation. Polyethylene Glycol 3350 Powd  
by Does Not Apply route. RisperDAL 2 mg tablet Generic drug:  risperiDONE Take  by mouth. TYLENOL 325 mg tablet Generic drug:  acetaminophen Take  by mouth every four (4) hours as needed for Pain. We Performed the Following ACETAMINOPHEN [id number] CPT(R)]   
 C286 IBUPROFEN [id number] CPT(R)] CBC WITH AUTOMATED DIFF [31978 CPT(R)] METABOLIC PANEL, COMPREHENSIVE [68018 CPT(R)] REFERRAL TO NEUROLOGY [PBS95 Custom] Referral Information Referral ID Referred By Referred To  
  
 [id number] Charlotte TRAN MD [address] [facility] [address] [facility], [address] [facility] Phone: [phone] Fax: [phone] Visits Status Start Date End Date 1 New Request 5/25/17 5/25/18 If your referral has a status of pending review or denied, additional information will be sent to support the outcome of this decision. Introducing [facility]! Cindy Zhang introduces [employer] patient portal. Now you can access parts of your medical record, email your doctor's office, and request medication refills online. 1. In your internet browser, go to [email or web address] 2. Click on the First Time User? Click Here link in the Sign In box. You will see the New Member Sign Up page. 3. Enter your [employer] Access Code exactly as it appears below. You will not need to use this code after youve completed the sign-up process. If you do not sign up before the expiration date, you must request a new code. · [employer] Access Code: [email or web address] Expires: 7/24/2017  1:50 PM 
 
4. Enter the last four digits of your Social Security Number (xxxx) and Date of Birth (mm/dd/yyyy) as indicated and click Submit. You will be taken to the next sign-up page. 5. Create a E[employer]-Apps ID. This will be your [employer] login ID and cannot be changed, so think of one that is secure and easy to remember. 6. Create a E[employer]Apps password. You can change your password at any time. 7. Enter your Password Reset Question and Answer. This can be used at a later time if you forget your password. 8. Enter your e-mail address. You will receive e-mail notification when new information is available in [address] 9. Click Sign Up. You can now view and download portions of your medical record. 10. Click the Download Summary menu link to download a portable copy of your medical information. If you have questions, please visit the Frequently Asked Questions section of the [employer] website. Remember, [employer] is NOT to be used for urgent needs. For medical emergencies, dial 911. Now available from your iPhone and Android! Please provide this summary of care documentation to your next provider. Your primary care clinician is listed as Nilam Hurtado. If you have any questions after today's visit, please call [phone].

## 2017-05-26 LAB
ALBUMIN SERPL-MCNC: 3.7 G/DL (ref 3.5–5.5)
ALBUMIN/GLOB SERPL: 1.1 {RATIO} (ref 1.2–2.2)
ALP SERPL-CCNC: 80 IU/L (ref 39–117)
ALT SERPL-CCNC: 17 IU/L (ref 0–32)
AST SERPL-CCNC: 17 IU/L (ref 0–40)
BASOPHILS # BLD AUTO: 0 X10E3/UL (ref 0–0.2)
BASOPHILS NFR BLD AUTO: 0 %
BILIRUB SERPL-MCNC: <0.2 MG/DL (ref 0–1.2)
BUN SERPL-MCNC: 11 MG/DL (ref 6–20)
BUN/CREAT SERPL: 13 (ref 9–23)
CALCIUM SERPL-MCNC: 8.9 MG/DL (ref 8.7–10.2)
CHLORIDE SERPL-SCNC: 98 MMOL/L (ref 96–106)
CO2 SERPL-SCNC: 21 MMOL/L (ref 18–29)
CREAT SERPL-MCNC: 0.87 MG/DL (ref 0.57–1)
EOSINOPHIL # BLD AUTO: 0.3 X10E3/UL (ref 0–0.4)
EOSINOPHIL NFR BLD AUTO: 3 %
ERYTHROCYTE [DISTWIDTH] IN BLOOD BY AUTOMATED COUNT: 14.5 % (ref 12.3–15.4)
GLOBULIN SER CALC-MCNC: 3.3 G/DL (ref 1.5–4.5)
GLUCOSE SERPL-MCNC: 75 MG/DL (ref 65–99)
HCT VFR BLD AUTO: 38.2 % (ref 34–46.6)
HGB BLD-MCNC: 12.5 G/DL (ref 11.1–15.9)
IMM GRANULOCYTES # BLD: 0 X10E3/UL (ref 0–0.1)
IMM GRANULOCYTES NFR BLD: 0 %
LYMPHOCYTES # BLD AUTO: 3.5 X10E3/UL (ref 0.7–3.1)
LYMPHOCYTES NFR BLD AUTO: 33 %
MCH RBC QN AUTO: 28.7 PG (ref 26.6–33)
MCHC RBC AUTO-ENTMCNC: 32.7 G/DL (ref 31.5–35.7)
MCV RBC AUTO: 88 FL (ref 79–97)
MONOCYTES # BLD AUTO: 0.6 X10E3/UL (ref 0.1–0.9)
MONOCYTES NFR BLD AUTO: 6 %
NEUTROPHILS # BLD AUTO: 6 X10E3/UL (ref 1.4–7)
NEUTROPHILS NFR BLD AUTO: 58 %
PLATELET # BLD AUTO: 297 X10E3/UL (ref 150–379)
POTASSIUM SERPL-SCNC: 4.4 MMOL/L (ref 3.5–5.2)
PROT SERPL-MCNC: 7 G/DL (ref 6–8.5)
RBC # BLD AUTO: 4.36 X10E6/UL (ref 3.77–5.28)
SODIUM SERPL-SCNC: 135 MMOL/L (ref 134–144)
WBC # BLD AUTO: 10.4 X10E3/UL (ref 3.4–10.8)

## 2017-05-27 LAB — APAP SERPL-MCNC: NEGATIVE UG/ML (ref 10–30)

## 2017-06-03 LAB — IBUPROFEN SERPL-MCNC: 5.9 UG/ML

## 2017-10-16 ENCOUNTER — TELEPHONE (OUTPATIENT)
Dept: FAMILY MEDICINE CLINIC | Age: 32
End: 2017-10-16

## 2017-10-16 NOTE — TELEPHONE ENCOUNTER
----- Message from Oneil Patel sent at 10/16/2017 12:39 PM EDT -----  Regarding: Dr. Burger/ Armando Hernandez with [facility] would like a call back to [phone] to know about a referral for neurologist

## 2018-01-23 ENCOUNTER — OFFICE VISIT (OUTPATIENT)
Dept: FAMILY MEDICINE CLINIC | Age: 33
End: 2018-01-23

## 2018-01-23 VITALS
HEIGHT: 69 IN | WEIGHT: 293 LBS | SYSTOLIC BLOOD PRESSURE: 130 MMHG | HEART RATE: 96 BPM | RESPIRATION RATE: 18 BRPM | OXYGEN SATURATION: 96 % | DIASTOLIC BLOOD PRESSURE: 83 MMHG | BODY MASS INDEX: 43.4 KG/M2 | TEMPERATURE: 97.6 F

## 2018-01-23 DIAGNOSIS — Z01.419 WELL WOMAN EXAM WITH ROUTINE GYNECOLOGICAL EXAM: ICD-10-CM

## 2018-01-23 DIAGNOSIS — R10.9 LEFT SIDED ABDOMINAL PAIN: Primary | ICD-10-CM

## 2018-01-23 PROBLEM — E66.01 OBESITY, MORBID (HCC): Status: ACTIVE | Noted: 2018-01-23

## 2018-01-23 LAB
BILIRUB UR QL STRIP: NEGATIVE
GLUCOSE UR-MCNC: NEGATIVE MG/DL
KETONES P FAST UR STRIP-MCNC: NEGATIVE MG/DL
PH UR STRIP: 5.5 [PH] (ref 4.6–8)
PROT UR QL STRIP: NEGATIVE
SP GR UR STRIP: 1.02 (ref 1–1.03)
UA UROBILINOGEN AMB POC: NORMAL (ref 0.2–1)
URINALYSIS CLARITY POC: CLEAR
URINALYSIS COLOR POC: YELLOW
URINE BLOOD POC: NORMAL
URINE LEUKOCYTES POC: NEGATIVE
URINE NITRITES POC: NEGATIVE

## 2018-01-23 NOTE — PROGRESS NOTES
Pt here with group home staff for annual physical.  Requesting to have forms filled out for [employer]. C/o pain in left side x 1 week. States pain is worse when laying down.

## 2018-01-23 NOTE — PROGRESS NOTES
Discussed the patient's BMI with her. The BMI follow up plan is as follows:     dietary management education, guidance, and counseling  encourage exercise  monitor weight  prescribed dietary intake    An After Visit Summary was printed and given to the patient. Subjective:   28 y.o. female for Well Woman Check. Her gyne and breast care is done elsewhere by her Ob-Gyne physician. 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 No TIA's or unusual headaches, no dysphagia. No prolonged cough. No dyspnea or chest pain on exertion. No abdominal pain, change in bowel habits, black or bloody stools. No urinary tract symptoms. No new or unusual musculoskeletal symptoms. Specific concerns today: left flank pain x 3 days. Objective: The patient appears well, alert, oriented x 3, in no distress. Visit Vitals    /83 (BP 1 Location: Left arm, BP Patient Position: Sitting)    Pulse 96    Temp 97.6 °F (36.4 °C) (Oral)    Resp 18    Ht 5' 9\" (1.753 m)    Wt 338 lb (153.3 kg)    SpO2 96%    BMI 49.91 kg/m2     ENT normal.  Neck supple. No adenopathy or thyromegaly. CRISTIAN. Lungs are clear, good air entry, no wheezes, rhonchi or rales. S1 and S2 normal, no murmurs, regular rate and rhythm. Abdomen soft without tenderness, guarding, mass or organomegaly. Extremities show no edema, normal peripheral pulses. Neurological is normal, no focal findings. Breast and Pelvic exams are deferred. Assessment/Plan:   Well Woman  lose weight, increase physical activity, routine labs ordered, call if any problems    ICD-10-CM ICD-9-CM    1.  Left sided abdominal pain R10.9 789.09 AMB POC URINALYSIS DIP STICK AUTO W/O MICRO   2. Well woman exam with routine gynecological exam Z01.419 V72.31     [V72.31]     Encounter Diagnoses   Name Primary?     Left sided abdominal pain Yes    Well woman exam with routine gynecological exam     Comment: [V72.31]     Orders Placed This Encounter    AMB POC URINALYSIS DIP STICK AUTO W/O MICRO

## 2018-01-23 NOTE — PATIENT INSTRUCTIONS
Body Mass Index: Care Instructions  Your Care Instructions    Body mass index (BMI) can help you see if your weight is raising your risk for health problems. It uses a formula to compare how much you weigh with how tall you are. · A BMI lower than 18.5 is considered underweight. · A BMI between 18.5 and 24.9 is considered healthy. · A BMI between 25 and 29.9 is considered overweight. A BMI of 30 or higher is considered obese. If your BMI is in the normal range, it means that you have a lower risk for weight-related health problems. If your BMI is in the overweight or obese range, you may be at increased risk for weight-related health problems, such as high blood pressure, heart disease, stroke, arthritis or joint pain, and diabetes. If your BMI is in the underweight range, you may be at increased risk for health problems such as fatigue, lower protection (immunity) against illness, muscle loss, bone loss, hair loss, and hormone problems. BMI is just one measure of your risk for weight-related health problems. You may be at higher risk for health problems if you are not active, you eat an unhealthy diet, or you drink too much alcohol or use tobacco products. Follow-up care is a key part of your treatment and safety. Be sure to make and go to all appointments, and call your doctor if you are having problems. It's also a good idea to know your test results and keep a list of the medicines you take. How can you care for yourself at home? · Practice healthy eating habits. This includes eating plenty of fruits, vegetables, whole grains, lean protein, and low-fat dairy. · If your doctor recommends it, get more exercise. Walking is a good choice. Bit by bit, increase the amount you walk every day. Try for at least 30 minutes on most days of the week. · Do not smoke. Smoking can increase your risk for health problems. If you need help quitting, talk to your doctor about stop-smoking programs and medicines. These can increase your chances of quitting for good. · Limit alcohol to 2 drinks a day for men and 1 drink a day for women. Too much alcohol can cause health problems. If you have a BMI higher than 25  · Your doctor may do other tests to check your risk for weight-related health problems. This may include measuring the distance around your waist. A waist measurement of more than 40 inches in men or 35 inches in women can increase the risk of weight-related health problems. · Talk with your doctor about steps you can take to stay healthy or improve your health. You may need to make lifestyle changes to lose weight and stay healthy, such as changing your diet and getting regular exercise. If you have a BMI lower than 18.5  · Your doctor may do other tests to check your risk for health problems. · Talk with your doctor about steps you can take to stay healthy or improve your health. You may need to make lifestyle changes to gain or maintain weight and stay healthy, such as getting more healthy foods in your diet and doing exercises to build muscle. Where can you learn more? Go to http://fang-dominic.info/. Enter S176 in the search box to learn more about \"Body Mass Index: Care Instructions. \"  Current as of: October 13, 2016  Content Version: 11.4  © 9795-9361 Healthwise, Incorporated. Care instructions adapted under license by [employer] (which disclaims liability or warranty for this information). If you have questions about a medical condition or this instruction, always ask your healthcare professional. [address] any warranty or liability for your use of this information.

## 2018-01-25 LAB — BACTERIA UR CULT: NORMAL

## 2018-03-08 ENCOUNTER — TELEPHONE (OUTPATIENT)
Dept: FAMILY MEDICINE CLINIC | Age: 33
End: 2018-03-08

## 2018-03-08 RX ORDER — IBUPROFEN 600 MG/1
600 TABLET ORAL
Qty: 30 TAB | Status: SHIPPED | OUTPATIENT
Start: 2018-03-08 | End: 2018-03-13 | Stop reason: SDUPTHER

## 2018-03-13 ENCOUNTER — TELEPHONE (OUTPATIENT)
Dept: FAMILY MEDICINE CLINIC | Age: 33
End: 2018-03-13

## 2018-03-13 RX ORDER — IBUPROFEN 600 MG/1
600 TABLET ORAL
Qty: 30 TAB | Status: SHIPPED | OUTPATIENT
Start: 2018-03-13 | End: 2019-05-31 | Stop reason: SDUPTHER

## 2018-03-13 NOTE — TELEPHONE ENCOUNTER
Chuck Price from [facility] states that motrin 600mg was suppose to go to [facility] instead of [facility]. Can you resend to [address].  Ms Clive Carmona can be reached @[phone]

## 2018-04-04 ENCOUNTER — OFFICE VISIT (OUTPATIENT)
Dept: FAMILY MEDICINE CLINIC | Age: 33
End: 2018-04-04

## 2018-04-04 VITALS
WEIGHT: 293 LBS | OXYGEN SATURATION: 97 % | HEIGHT: 69 IN | HEART RATE: 85 BPM | SYSTOLIC BLOOD PRESSURE: 112 MMHG | BODY MASS INDEX: 43.4 KG/M2 | TEMPERATURE: 98.7 F | RESPIRATION RATE: 18 BRPM | DIASTOLIC BLOOD PRESSURE: 75 MMHG

## 2018-04-04 DIAGNOSIS — E66.01 OBESITY, MORBID (HCC): Primary | ICD-10-CM

## 2018-04-04 DIAGNOSIS — F79 INTELLECTUAL DISABILITY: ICD-10-CM

## 2018-04-04 NOTE — MR AVS SNAPSHOT
[address] [address] 
[phone] Patient: Jessica Cortes MRN: [id number] :1985 Visit Information Date & Time Provider Department Dept. Phone Encounter #  
 2018  9:45 AM Ysabel Penaloza MD [facility] [phone] [id number] Upcoming Health Maintenance Date Due Pneumococcal 19-64 Medium Risk (1 of 1 - PPSV23) 2004 DTaP/Tdap/Td series (1 - Tdap) 2006 PAP AKA CERVICAL CYTOLOGY 2006 Allergies as of 2018  Review Complete On: 2018 By: Ysabel Penaloza MD  
  
 Severity Noted Reaction Type Reactions Tomato  2017    Rash Per pt report Current Immunizations  Reviewed on 2017 Name Date  
 TB Skin Test (PPD) Intradermal 2017 Not reviewed this visit You Were Diagnosed With   
  
 Codes Comments Ob[facility], morbid [facility]    -  Primary ICD-10-CM: E66.01 
ICD-9-CM: 278.01 Intellectual disability     ICD-10-CM: F79 
ICD-9-CM: 009 Vitals BP Pulse Temp Resp Height(growth percentile) Weight(growth percentile) 112/75 (BP 1 Location: Left arm, BP Patient Position: Sitting) 85 98.7 °F (37.1 °C) (Oral) 18 5' 9\" (1.753 m) 336 lb (152.4 kg) SpO2 BMI OB Status Smoking Status 97% 49.62 kg/m2 Unknown Never Smoker Vitals History BMI and BSA Data Body Mass Index Body Surface Area  
 49.62 kg/m 2 2.72 m 2 Preferred Pharmacy Pharmacy Name Phone [facility] [address] [phone] Your Updated Medication List  
  
   
This list is accurate as of 18 10:58 AM.  Always use your most recent med list.  
  
  
  
  
 acyclovir 400 mg tablet Commonly known as:  ZOVIRAX Take 400 mg by mouth every four (4) hours (while awake). citalopram 40 mg tablet Commonly known as:  Chinedu Covert Take 40 mg by mouth daily. COLACE 100 mg capsule Generic drug:  docusate sodium Take 100 mg by mouth two (2) times a day. fluticasone 50 mcg/actuation nasal spray Commonly known as:  Leafy Few 2 Sprays by Both Nostrils route daily. ibuprofen 600 mg tablet Commonly known as:  MOTRIN Take 1 Tab by mouth every eight (8) hours as needed for Pain.  
  
 loratadine 10 mg Cap Take  by mouth. norethindrone-ethinyl estradiol 1-35 mg-mcg Tab Commonly known as:  ORTHO-NOVUM 1-35 TAB, NORTREL 1-35 TAB Take 1 Tab by mouth daily. nystatin powder Commonly known as:  MYCOSTATIN Apply  to affected area three (3) times daily as needed. ondansetron 4 mg disintegrating tablet Commonly known as:  ZOFRAN ODT Take 4 mg by mouth every eight (8) hours as needed for Nausea. HIGUERA MILK OF MAGNESIA 400 mg/5 mL suspension Generic drug:  magnesium hydroxide Take 30 mL by mouth daily as needed for Constipation. Polyethylene Glycol 3350 Powd  
by Does Not Apply route. RisperDAL 2 mg tablet Generic drug:  risperiDONE Take  by mouth. TYLENOL 325 mg tablet Generic drug:  acetaminophen Take  by mouth every four (4) hours as needed for Pain. We Performed the Following CBC WITH AUTOMATED DIFF [07886 CPT(R)] HEMOGLOBIN A1C WITH EAG [59188 CPT(R)] LIPID PANEL [82912 CPT(R)] METABOLIC PANEL, COMPREHENSIVE [99133 CPT(R)] QUANTIFERON TB GOLD [[id number] Custom] TSH 3RD GENERATION [39486 CPT(R)] Introducing [facility]! [employer] introduces [employer] patient portal. Now you can access parts of your medical record, email your doctor's office, and request medication refills online. 1. In your internet browser, go to [email or web address] 2. Click on the First Time User? Click Here link in the Sign In box. You will see the New Member Sign Up page. 3. Enter your [employer] Access Code exactly as it appears below.  You will not need to use this code after youve completed the sign-up process. If you do not sign up before the expiration date, you must request a new code. · Gurubooks Access Code: [email or web address] Expires: 4/23/2018  3:00 PM 
 
4. Enter the last four digits of your Social Security Number (xxxx) and Date of Birth (mm/dd/yyyy) as indicated and click Submit. You will be taken to the next sign-up page. 5. Create a Gurubooks ID. This will be your Gurubooks login ID and cannot be changed, so think of one that is secure and easy to remember. 6. Create a Gurubooks password. You can change your password at any time. 7. Enter your Password Reset Question and Answer. This can be used at a later time if you forget your password. 8. Enter your e-mail address. You will receive e-mail notification when new information is available in [address]. 9. Click Sign Up. You can now view and download portions of your medical record. 10. Click the Download Summary menu link to download a portable copy of your medical information. If you have questions, please visit the Frequently Asked Questions section of the Gur[employer]ooks website. Remember, Gurubooks is NOT to be used for urgent needs. For medical emergencies, dial 911. Now available from your iPhone and Android! Please provide this summary of care documentation to your next provider. Your primary care clinician is listed as Sonja Thomas. If you have any questions after today's visit, please call [phone].

## 2018-04-04 NOTE — PROGRESS NOTES
Pt here with group home staff for routine group home physical.  Requesting to have TB screening as well. Subjective:   28 y.o. female for Well Woman Check. Her gyne and breast care is done elsewhere by her Ob-Gyne physician. Patient Active Problem List   Diagnosis Code    HPV in female B80.11    Attention deficit hyperactivity disorder (ADHD) F90.9    Intellectual disability F79    Mild intermittent asthma without complication J57.53    Obesity, morbid (HCC) E66.01     Current Outpatient Prescriptions   Medication Sig Dispense Refill    ibuprofen (MOTRIN) 600 mg tablet Take 1 Tab by mouth every eight (8) hours as needed for Pain. 30 Tab prn    norethindrone-ethinyl estradiol (ORTHO-NOVUM 1-35 TAB, NORTREL 1-35 TAB) 1-35 mg-mcg tab Take 1 Tab by mouth daily. 1 Package 5    nystatin (MYCOSTATIN) powder Apply  to affected area three (3) times daily as needed. 60 g 3    fluticasone (FLONASE) 50 mcg/actuation nasal spray 2 Sprays by Both Nostrils route daily.  acetaminophen (TYLENOL) 325 mg tablet Take  by mouth every four (4) hours as needed for Pain.  docusate sodium (COLACE) 100 mg capsule Take 100 mg by mouth two (2) times a day.  magnesium hydroxide (HIGUERA MILK OF MAGNESIA) 400 mg/5 mL suspension Take 30 mL by mouth daily as needed for Constipation.  loratadine 10 mg cap Take  by mouth.  risperiDONE (RISPERDAL) 2 mg tablet Take  by mouth.  citalopram (CELEXA) 40 mg tablet Take 40 mg by mouth daily.  ondansetron (ZOFRAN ODT) 4 mg disintegrating tablet Take 4 mg by mouth every eight (8) hours as needed for Nausea.  Polyethylene Glycol 3350 powd by Does Not Apply route.  acyclovir (ZOVIRAX) 400 mg tablet Take 400 mg by mouth every four (4) hours (while awake).        Allergies   Allergen Reactions    Tomato Rash     Per pt report        Lab Results   Component Value Date/Time    Hemoglobin A1c 5.6 01/26/2017 10:39 AM    Glucose 75 05/25/2017 11:32 AM LDL, calculated 81 01/26/2017 10:39 AM    Creatinine 0.87 05/25/2017 11:32 AM         ROS: Feeling generally well. No TIA's or unusual headaches, no dysphagia. No prolonged cough. No dyspnea or chest pain on exertion. No abdominal pain, change in bowel habits, black or bloody stools. No urinary tract symptoms. No new or unusual musculoskeletal symptoms. Specific concerns today: None at this time. Objective: The patient appears well, alert, oriented x 3, in no distress. Visit Vitals    /75 (BP 1 Location: Left arm, BP Patient Position: Sitting)    Pulse 85    Temp 98.7 °F (37.1 °C) (Oral)    Resp 18    Ht 5' 9\" (1.753 m)    Wt 336 lb (152.4 kg)    SpO2 97%    BMI 49.62 kg/m2     ENT normal.  Neck supple. No adenopathy or thyromegaly. CRISTIAN. Lungs are clear, good air entry, no wheezes, rhonchi or rales. S1 and S2 normal, no murmurs, regular rate and rhythm. Abdomen soft without tenderness, guarding, mass or organomegaly. Extremities show no edema, normal peripheral pulses. Neurological is normal, no focal findings. Breast and Pelvic exams are deferred. Assessment/Plan:   Well Woman  lose weight, increase physical activity    ICD-10-CM ICD-9-CM    1. Obesity, morbid ([facility] U35.19 427.43 METABOLIC PANEL, COMPREHENSIVE      CBC WITH AUTOMATED DIFF      LIPID PANEL      HEMOGLOBIN A1C WITH EAG      TSH 3RD GENERATION      QUANTIFERON TB GOLD   2. Intellectual disability F79 319      Encounter Diagnoses   Name Primary?     Obesity, morbid ([facility] Yes    Intellectual disability      Orders Placed This Encounter    QUANTIFERON TB GOLD    METABOLIC PANEL, COMPREHENSIVE    CBC WITH AUTOMATED DIFF    LIPID PANEL    HEMOGLOBIN A1C WITH EAG    TSH 3RD GENERATION

## 2018-04-07 LAB
ALBUMIN SERPL-MCNC: 3.5 G/DL (ref 3.5–5.5)
ALBUMIN/GLOB SERPL: 1.2 {RATIO} (ref 1.2–2.2)
ALP SERPL-CCNC: 85 IU/L (ref 39–117)
ALT SERPL-CCNC: 15 IU/L (ref 0–32)
ANNOTATION COMMENT IMP: NORMAL
AST SERPL-CCNC: 15 IU/L (ref 0–40)
BASOPHILS # BLD AUTO: 0 X10E3/UL (ref 0–0.2)
BASOPHILS NFR BLD AUTO: 0 %
BILIRUB SERPL-MCNC: <0.2 MG/DL (ref 0–1.2)
BUN SERPL-MCNC: 11 MG/DL (ref 6–20)
BUN/CREAT SERPL: 12 (ref 9–23)
CALCIUM SERPL-MCNC: 8.9 MG/DL (ref 8.7–10.2)
CHLORIDE SERPL-SCNC: 103 MMOL/L (ref 96–106)
CHOLEST SERPL-MCNC: 164 MG/DL (ref 100–199)
CO2 SERPL-SCNC: 22 MMOL/L (ref 18–29)
CREAT SERPL-MCNC: 0.91 MG/DL (ref 0.57–1)
EOSINOPHIL # BLD AUTO: 0.2 X10E3/UL (ref 0–0.4)
EOSINOPHIL NFR BLD AUTO: 2 %
ERYTHROCYTE [DISTWIDTH] IN BLOOD BY AUTOMATED COUNT: 14.4 % (ref 12.3–15.4)
EST. AVERAGE GLUCOSE BLD GHB EST-MCNC: 117 MG/DL
GAMMA INTERFERON BACKGROUND BLD IA-ACNC: 0.06 IU/ML
GFR SERPLBLD CREATININE-BSD FMLA CKD-EPI: 84 ML/MIN/1.73
GFR SERPLBLD CREATININE-BSD FMLA CKD-EPI: 97 ML/MIN/1.73
GLOBULIN SER CALC-MCNC: 3 G/DL (ref 1.5–4.5)
GLUCOSE SERPL-MCNC: 78 MG/DL (ref 65–99)
HBA1C MFR BLD: 5.7 % (ref 4.8–5.6)
HCT VFR BLD AUTO: 36.1 % (ref 34–46.6)
HDLC SERPL-MCNC: 53 MG/DL
HGB BLD-MCNC: 11.7 G/DL (ref 11.1–15.9)
IMM GRANULOCYTES # BLD: 0 X10E3/UL (ref 0–0.1)
IMM GRANULOCYTES NFR BLD: 0 %
INTERPRETATION, 910389: NORMAL
LDLC SERPL CALC-MCNC: 85 MG/DL (ref 0–99)
LYMPHOCYTES # BLD AUTO: 3.2 X10E3/UL (ref 0.7–3.1)
LYMPHOCYTES NFR BLD AUTO: 27 %
M TB IFN-G BLD-IMP: NEGATIVE
M TB IFN-G CD4+ BCKGRND COR BLD-ACNC: 0.03 IU/ML
M TB IFN-G CD4+ T-CELLS BLD-ACNC: 0.09 IU/ML
MCH RBC QN AUTO: 27.6 PG (ref 26.6–33)
MCHC RBC AUTO-ENTMCNC: 32.4 G/DL (ref 31.5–35.7)
MCV RBC AUTO: 85 FL (ref 79–97)
MITOGEN IGNF BLD-ACNC: 6.4 IU/ML
MONOCYTES # BLD AUTO: 1 X10E3/UL (ref 0.1–0.9)
MONOCYTES NFR BLD AUTO: 8 %
NEUTROPHILS # BLD AUTO: 7.6 X10E3/UL (ref 1.4–7)
NEUTROPHILS NFR BLD AUTO: 63 %
PLATELET # BLD AUTO: 325 X10E3/UL (ref 150–379)
POTASSIUM SERPL-SCNC: 4.2 MMOL/L (ref 3.5–5.2)
PROT SERPL-MCNC: 6.5 G/DL (ref 6–8.5)
QUANTIFERON INCUBATION: NORMAL
RBC # BLD AUTO: 4.24 X10E6/UL (ref 3.77–5.28)
SERVICE CMNT-IMP: NORMAL
SODIUM SERPL-SCNC: 138 MMOL/L (ref 134–144)
TRIGL SERPL-MCNC: 128 MG/DL (ref 0–149)
TSH SERPL DL<=0.005 MIU/L-ACNC: 2.58 UIU/ML (ref 0.45–4.5)
VLDLC SERPL CALC-MCNC: 26 MG/DL (ref 5–40)
WBC # BLD AUTO: 11.9 X10E3/UL (ref 3.4–10.8)

## 2018-04-08 NOTE — PROGRESS NOTES
Increase in risk for diabetes, please closely monitor diet and increase exercise   All other labs are within normal limits. Please inform  Recheck in 6 months.

## 2018-04-09 NOTE — PROGRESS NOTES
Left voicemail and number for patient to give the office a call back, per [place] LPN.  (see note)

## 2018-04-10 NOTE — PROGRESS NOTES
Left message on [phone] voicemail to call office back. Phone number was provided by supervisor from previous group home.

## 2018-07-20 ENCOUNTER — OFFICE VISIT (OUTPATIENT)
Dept: FAMILY MEDICINE CLINIC | Age: 33
End: 2018-07-20

## 2018-07-20 VITALS
OXYGEN SATURATION: 98 % | DIASTOLIC BLOOD PRESSURE: 68 MMHG | SYSTOLIC BLOOD PRESSURE: 101 MMHG | RESPIRATION RATE: 18 BRPM | BODY MASS INDEX: 43.4 KG/M2 | HEART RATE: 76 BPM | HEIGHT: 69 IN | WEIGHT: 293 LBS | TEMPERATURE: 98.2 F

## 2018-07-20 DIAGNOSIS — B37.2 YEAST INFECTION OF THE SKIN: Primary | ICD-10-CM

## 2018-07-20 RX ORDER — NYSTATIN 100000 U/G
CREAM TOPICAL
Qty: 60 G | Refills: 2 | Status: SHIPPED | OUTPATIENT
Start: 2018-07-20 | End: 2019-02-18 | Stop reason: SDUPTHER

## 2018-07-20 NOTE — MR AVS SNAPSHOT
[address] [address] 
[phone] Patient: Renetta Alejandro MRN: [id number] :1985 Visit Information Date & Time Provider Department Dept. Phone Encounter #  
 2018 11:00 AM Bear Funes MD [address] [facility] [phone] [id number] Upcoming Health Maintenance Date Due Pneumococcal 19-64 Medium Risk (1 of 1 - PPSV23) 2004 DTaP/Tdap/Td series (1 - Tdap) 2006 PAP AKA CERVICAL CYTOLOGY 2006 Influenza Age 5 to Adult 2018 Allergies as of 2018  Review Complete On: 2018 By: Bear Funes MD  
  
 Severity Noted Reaction Type Reactions Tomato  2017    Rash Per pt report Current Immunizations  Reviewed on 2017 Name Date  
 TB Skin Test (PPD) Intradermal 2017 Not reviewed this visit You Were Diagnosed With   
  
 Codes Comments Yeast infection of the skin    -  Primary ICD-10-CM: B37.2 ICD-9-CM: 112.3 Vitals BP Pulse Temp Resp Height(growth percentile) Weight(growth percentile) 101/68 (BP 1 Location: Left arm, BP Patient Position: Sitting) 76 98.2 °F (36.8 °C) (Oral) 18 5' 9\" (1.753 m) 329 lb (149.2 kg) SpO2 BMI OB Status Smoking Status 98% 48.58 kg/m2 Unknown Never Smoker Vitals History BMI and BSA Data Body Mass Index Body Surface Area 48.58 kg/m 2 2.7 m 2 Preferred Pharmacy Pharmacy Name Phone [facility], [address] [phone] Your Updated Medication List  
  
   
This list is accurate as of 18 11:33 AM.  Always use your most recent med list.  
  
  
  
  
 acyclovir 400 mg tablet Commonly known as:  ZOVIRAX Take 400 mg by mouth every four (4) hours (while awake). citalopram 40 mg tablet Commonly known as:  Viona Creamer Take 40 mg by mouth daily. COLACE 100 mg capsule Generic drug:  docusate sodium Take 100 mg by mouth two (2) times a day. fluticasone 50 mcg/actuation nasal spray Commonly known as:  Lindalee Cucumber 2 Sprays by Both Nostrils route daily. ibuprofen 600 mg tablet Commonly known as:  MOTRIN Take 1 Tab by mouth every eight (8) hours as needed for Pain.  
  
 loratadine 10 mg Cap Take  by mouth. norethindrone-ethinyl estradiol 1-35 mg-mcg Tab Commonly known as:  ORTHO-NOVUM 1-35 TAB, NORTREL 1-35 TAB Take 1 Tab by mouth daily. * nystatin powder Commonly known as:  MYCOSTATIN Apply  to affected area three (3) times daily as needed. * nystatin topical cream  
Commonly known as:  MYCOSTATIN Apply  to affected area two (2) times daily as needed for Skin Irritation. ondansetron 4 mg disintegrating tablet Commonly known as:  ZOFRAN ODT Take 4 mg by mouth every eight (8) hours as needed for Nausea. HIGUERA MILK OF MAGNESIA 400 mg/5 mL suspension Generic drug:  magnesium hydroxide Take 30 mL by mouth daily as needed for Constipation. Polyethylene Glycol 3350 Powd  
by Does Not Apply route. RisperDAL 2 mg tablet Generic drug:  risperiDONE Take  by mouth. TYLENOL 325 mg tablet Generic drug:  acetaminophen Take  by mouth every four (4) hours as needed for Pain. * Notice: This list has 2 medication(s) that are the same as other medications prescribed for you. Read the directions carefully, and ask your doctor or other care provider to review them with you. Prescriptions Sent to Pharmacy Refills  
 nystatin (MYCOSTATIN) topical cream 2 Sig: Apply  to affected area two (2) times daily as needed for Skin Irritation. Class: Normal  
 Pharmacy: [address] [place] [address] [facility] Ph #: [phone] Route: Topical  
  
Introducing [facility]!    
 [facility] introduces [employer] patient portal. Now you can access parts of your medical record, email your doctor's office, and request medication refills online. 1. In your internet browser, go to [email or web address] 2. Click on the First Time User? Click Here link in the Sign In box. You will see the New Member Sign Up page. 3. Enter your [employer] Access Code exactly as it appears below. You will not need to use this code after youve completed the sign-up process. If you do not sign up before the expiration date, you must request a new code. · [employer] Access Code: [email or web address] Expires: 10/18/2018 11:33 AM 
 
4. Enter the last four digits of your Social Security Number (xxxx) and Date of Birth (mm/dd/yyyy) as indicated and click Submit. You will be taken to the next sign-up page. 5. Create a [employer] ID. This will be your [employer] login ID and cannot be changed, so think of one that is secure and easy to remember. 6. Create a [employer] password. You can change your password at any time. 7. Enter your Password Reset Question and Answer. This can be used at a later time if you forget your password. 8. Enter your e-mail address. You will receive e-mail notification when new information is available in [address] 9. Click Sign Up. You can now view and download portions of your medical record. 10. Click the Download Summary menu link to download a portable copy of your medical information. If you have questions, please visit the Frequently Asked Questions section of the [employer] website. Remember, [employer] is NOT to be used for urgent needs. For medical emergencies, dial 911. Now available from your iPhone and Android! Please provide this summary of care documentation to your next provider. Your primary care clinician is listed as Federica Schafer. If you have any questions after today's visit, please call [phone].

## 2018-07-20 NOTE — PROGRESS NOTES
Pt here with group home counselor c/o rash under abdomen x 1 week. Reports having itching, but denies any pain. Caregiver states redness has improved. Pt has been using Vaseline on rash. Subjective: (As above and below)     Chief Complaint   Patient presents with    Rash     under abdomen     she is a 35y.o. year old female who presents for evaluation. Reviewed PmHx, RxHx, FmHx, SocHx, AllgHx and updated in chart. Review of Systems - negative except as listed above    Objective:     Vitals:    07/20/18 1116   BP: 101/68   Pulse: 76   Resp: 18   Temp: 98.2 °F (36.8 °C)   TempSrc: Oral   SpO2: 98%   Weight: 329 lb (149.2 kg)   Height: 5' 9\" (1.753 m)     Physical Examination: General appearance - alert, well appearing, and in no distress  Mental status - alert, oriented to person, place, and time  Chest - clear to auscultation, no wheezes, rales or rhonchi, symmetric air entry  Heart - normal rate, regular rhythm, normal S1, S2, no murmurs, rubs, clicks or gallops  Skin - erythematous rash under pannus, yeast odor    Assessment/ Plan:   1. Yeast infection of the skin  Use cream as needed  - nystatin (MYCOSTATIN) topical cream; Apply  to affected area two (2) times daily as needed for Skin Irritation. Dispense: 60 g; Refill: 2     Reviewed care with pt and caregiver, also reviewed the need for goof hygiene, keeping area clean and dry    Follow-up Disposition: As needed  I have discussed the diagnosis with the patient and the intended plan as seen in the above orders. The patient has received an after-visit summary and questions were answered concerning future plans.      Medication Side Effects and Warnings were discussed with patient: yes  Patient Labs were reviewed: yes  Patient Past Records were reviewed:  yes    Beatriz Guaman M.D.

## 2018-08-09 RX ORDER — LORATADINE 10 MG/1
TABLET ORAL
Qty: 31 TAB | Status: SHIPPED | OUTPATIENT
Start: 2018-08-09

## 2018-09-14 RX ORDER — FLUTICASONE PROPIONATE 50 MCG
SPRAY, SUSPENSION (ML) NASAL
Qty: 16 G | Refills: 0 | Status: SHIPPED | OUTPATIENT
Start: 2018-09-14 | End: 2018-10-19 | Stop reason: SDUPTHER

## 2018-09-14 RX ORDER — NORETHINDRONE ACETATE/ETHINYL ESTRADIOL AND FERROUS FUMARATE 1MG-20(21)
KIT ORAL
Qty: 28 TAB | Refills: 0 | Status: SHIPPED | OUTPATIENT
Start: 2018-09-14 | End: 2018-10-09 | Stop reason: SDUPTHER

## 2018-10-19 RX ORDER — FLUTICASONE PROPIONATE 50 MCG
SPRAY, SUSPENSION (ML) NASAL
Qty: 16 G | Refills: 98 | Status: SHIPPED | OUTPATIENT
Start: 2018-10-19 | End: 2019-05-21

## 2019-01-23 ENCOUNTER — HOSPITAL ENCOUNTER (OUTPATIENT)
Dept: LAB | Age: 34
Discharge: HOME | End: 2019-01-23
Payer: MEDICARE

## 2019-01-23 ENCOUNTER — OFFICE VISIT (OUTPATIENT)
Dept: FAMILY MEDICINE CLINIC | Age: 34
End: 2019-01-23

## 2019-01-23 VITALS
OXYGEN SATURATION: 100 % | BODY MASS INDEX: 43.4 KG/M2 | HEIGHT: 69 IN | TEMPERATURE: 97.9 F | WEIGHT: 293 LBS | SYSTOLIC BLOOD PRESSURE: 136 MMHG | DIASTOLIC BLOOD PRESSURE: 80 MMHG | HEART RATE: 75 BPM | RESPIRATION RATE: 20 BRPM

## 2019-01-23 DIAGNOSIS — N91.2 AMENORRHEA: ICD-10-CM

## 2019-01-23 DIAGNOSIS — H69.93 EUSTACHIAN TUBE DISORDER, BILATERAL: ICD-10-CM

## 2019-01-23 DIAGNOSIS — B37.2 YEAST INFECTION OF THE SKIN: Primary | ICD-10-CM

## 2019-01-23 DIAGNOSIS — R39.15 URINARY URGENCY: ICD-10-CM

## 2019-01-23 DIAGNOSIS — J30.89 ENVIRONMENTAL AND SEASONAL ALLERGIES: ICD-10-CM

## 2019-01-23 LAB
BILIRUB UR QL STRIP: NEGATIVE
GLUCOSE UR-MCNC: NEGATIVE MG/DL
KETONES P FAST UR STRIP-MCNC: NEGATIVE MG/DL
PH UR STRIP: 6 [PH] (ref 4.6–8)
PROT UR QL STRIP: NEGATIVE
SP GR UR STRIP: 1.01 (ref 1–1.03)
UA UROBILINOGEN AMB POC: NORMAL (ref 0.2–1)
URINALYSIS CLARITY POC: CLEAR
URINALYSIS COLOR POC: YELLOW
URINE BLOOD POC: NEGATIVE
URINE LEUKOCYTES POC: NEGATIVE
URINE NITRITES POC: NEGATIVE

## 2019-01-23 PROCEDURE — 87086 URINE CULTURE/COLONY COUNT: CPT

## 2019-01-23 RX ORDER — NYSTATIN AND TRIAMCINOLONE ACETONIDE 100000; 1 [USP'U]/G; MG/G
CREAM TOPICAL 2 TIMES DAILY
Qty: 30 G | Refills: 0 | Status: SHIPPED | OUTPATIENT
Start: 2019-01-23 | End: 2019-02-18 | Stop reason: ALTCHOICE

## 2019-01-23 RX ORDER — AZELASTINE 1 MG/ML
1 SPRAY, METERED NASAL 2 TIMES DAILY
Qty: 1 BOTTLE | Refills: 5 | Status: SHIPPED | OUTPATIENT
Start: 2019-01-23

## 2019-01-23 NOTE — PROGRESS NOTES
1. Have you been to the ER, urgent care clinic since your last visit? Hospitalized since your last visit? No    2. Have you seen or consulted any other health care providers outside of the [address] [place] [address] since your last visit? Include any pap smears or colon screening.  No  Chief Complaint   Patient presents with    Ear Pain     left ear pain- couple days script for Mortin    Vaginal Itching     vaginal itching

## 2019-01-23 NOTE — PATIENT INSTRUCTIONS
Yeast Skin Infection: Care Instructions  Your Care Instructions    Yeast normally lives on your skin. Sometimes too much yeast can overgrow in certain areas of the skin and cause an infection. The infection causes red, scaly, moist patches on your skin that may itch. Common areas for skin yeast infections are skin folds under the breasts or belly area. The warm and moist areas in the skin folds can make it easier for yeast to overgrow. Yeast infections also can be found on other parts of the body such as the groin or armpits. You will probably get a cream or ointment that contains an antifungal medicine. Examples of these are miconazole and clotrimazole. You put it on your skin to treat the infection. Your doctor may give you a prescription for the cream or ointment. Or you may be able to buy it without a prescription at most drugstores. If the infection is severe, the doctor will prescribe antifungal pills. A yeast infection usually goes away after about a week of treatment. But it's important to use the medicine for as long as your doctor tells you to. Follow-up care is a key part of your treatment and safety. Be sure to make and go to all appointments, and call your doctor if you are having problems. It's also a good idea to know your test results and keep a list of the medicines you take. How can you care for yourself at home? · Be safe with medicines. Take your medicines exactly as prescribed. Call your doctor if you think you are having a problem with your medicine. · Keep your skin clean and dry. Your doctor may suggest using powder that contains an antifungal medicine in the skin folds. · Wear loose clothing. When should you call for help? Call your doctor now or seek immediate medical care if:    · You have symptoms of infection, such as:  ? Increased pain, swelling, warmth, or redness. ? Red streaks leading from the area. ? Pus draining from the area.   ? A fever.    Watch closely for changes in your health, and be sure to contact your doctor if:    · You do not get better as expected. Where can you learn more? Go to http://fang-dominic.info/. Enter K041 in the search box to learn more about \"Yeast Skin Infection: Care Instructions. \"  Current as of: April 17, 2018  Content Version: 11.9  © 4857-2125 [email or web address]. Care instructions adapted under license by [employer] (which disclaims liability or warranty for this information). If you have questions about a medical condition or this instruction, always ask your healthcare professional. [address] any warranty or liability for your use of this information.

## 2019-01-23 NOTE — PROGRESS NOTES
Chief Complaint   Patient presents with    Ear Pain     left ear pain- couple days script for Mortin    Vaginal Itching     vaginal itching     she is a 35y.o. year old female who presents for evalution. Pt has been having some left ear pain, started on Saturday. Has been requesting rx for pain which pt needs refill of - Ibuprofen. Felt like Ibuprofen helped when does take. No congestion, coughing, sore throat, fever/chills. Also has rash in pelvis, unsure how long has been present. Pt states it is itchy. Has not tried any OTCs. Has not had period in at least past 4 months. Pt states cannot remember when her last period was. Takes birth control daily, does not bleed during sugar pills. Has been having urinary urgency, has had accident if does not go to bathroom right away. No dysuria or frequency. Counselor would like checked today to ensure no infection. Reviewed PmHx, RxHx, FmHx, SocHx, AllgHx and updated and dated in the chart.     Review of Systems - negative except as listed above in the HPI    Objective:     Vitals:    01/23/19 0953   BP: 136/80   Pulse: 75   Resp: 20   Temp: 97.9 °F (36.6 °C)   TempSrc: Oral   SpO2: 100%   Weight: 333 lb 2 oz (151.1 kg)   Height: 5' 9\" (1.753 m)     Physical Examination: General appearance - alert, well appearing, and in no distress  Ears - bilateral TM's and external ear canals normal  Nose - normal nontender sinuses, mucosal congestion and mucosal pallor  Mouth - mucous membranes moist, pharynx normal without lesions  Neck - supple, no significant adenopathy  Chest - clear to auscultation, no wheezes, rales or rhonchi, symmetric air entry  Heart - normal rate, regular rhythm, normal S1, S2, no murmurs, rubs, clicks or gallops  Abdomen - soft, nontender, nondistended, no masses or organomegaly  no CVA tenderness  Skin - skin on lower abdomen pelvis erythematous, peeling - consistent with yeast.     Assessment/ Plan:   Diagnoses and all orders for this visit:    1. Yeast infection of the skin  -     nystatin-triamcinolone (MYCOLOG II) topical cream; Apply  to affected area two (2) times a day. For 2 weeks, apply to rash and 2 inches beyond  New rx. Keep area clean and dry. F/U prn    2. Eustachian tube disorder, bilateral  -     azelastine (ASTELIN) 137 mcg (0.1 %) nasal spray; 1 [place] by Both Nostrils route two (2) times a day. Use in each nostril as directed  New rx, D/C Flonase. May also use OTC Motrin prn.     3. Environmental and seasonal allergies  -     azelastine (ASTELIN) 137 mcg (0.1 %) nasal spray; 1 [place] by Both Nostrils route two (2) times a day. Use in each nostril as directed    4. Urinary urgency  -     CULTURE, URINE  -AM POC DIPSTICK  No signs of infection on dipstick but will send out for culture to ensure. 5. Amenorrhea  -     REFERRAL TO OBSTETRICS AND GYNECOLOGY  Unclear etiology, F/U with GYN for further assessment. Pt voiced understanding regarding plan of care. Follow-up Disposition:  Return if symptoms worsen or fail to improve. I have discussed the diagnosis with the patient and the intended plan as seen in the above orders. The patient has received an after-visit summary and questions were answered concerning future plans.      Medication Side Effects and Warnings were discussed with patient    Norma Robison NP

## 2019-01-24 LAB — BACTERIA UR CULT: NORMAL

## 2019-02-18 DIAGNOSIS — B37.2 YEAST INFECTION OF THE SKIN: ICD-10-CM

## 2019-02-18 RX ORDER — TRIAMCINOLONE ACETONIDE 1 MG/G
CREAM TOPICAL
Qty: 30 G | Refills: 0 | Status: SHIPPED | OUTPATIENT
Start: 2019-02-18 | End: 2019-05-21

## 2019-02-18 RX ORDER — NYSTATIN 100000 U/G
CREAM TOPICAL
Qty: 30 G | Refills: 0 | Status: SHIPPED | OUTPATIENT
Start: 2019-02-18 | End: 2019-05-21

## 2019-05-21 ENCOUNTER — OFFICE VISIT (OUTPATIENT)
Dept: FAMILY MEDICINE CLINIC | Age: 34
End: 2019-05-21

## 2019-05-21 ENCOUNTER — HOSPITAL ENCOUNTER (OUTPATIENT)
Dept: LAB | Age: 34
Discharge: HOME | End: 2019-05-21
Payer: MEDICARE

## 2019-05-21 VITALS
DIASTOLIC BLOOD PRESSURE: 70 MMHG | HEIGHT: 69 IN | BODY MASS INDEX: 43.4 KG/M2 | HEART RATE: 79 BPM | RESPIRATION RATE: 18 BRPM | SYSTOLIC BLOOD PRESSURE: 102 MMHG | WEIGHT: 293 LBS | OXYGEN SATURATION: 97 % | TEMPERATURE: 98 F

## 2019-05-21 DIAGNOSIS — F90.2 ATTENTION DEFICIT HYPERACTIVITY DISORDER (ADHD), COMBINED TYPE: ICD-10-CM

## 2019-05-21 DIAGNOSIS — Z13.29 SCREENING FOR THYROID DISORDER: ICD-10-CM

## 2019-05-21 DIAGNOSIS — R73.01 IMPAIRED FASTING GLUCOSE: ICD-10-CM

## 2019-05-21 DIAGNOSIS — Z11.1 SCREENING EXAMINATION FOR PULMONARY TUBERCULOSIS: ICD-10-CM

## 2019-05-21 DIAGNOSIS — E66.01 OBESITY, MORBID (HCC): ICD-10-CM

## 2019-05-21 DIAGNOSIS — Z00.00 MEDICARE ANNUAL WELLNESS VISIT, SUBSEQUENT: Primary | ICD-10-CM

## 2019-05-21 PROCEDURE — 86480 TB TEST CELL IMMUN MEASURE: CPT

## 2019-05-21 PROCEDURE — 83036 HEMOGLOBIN GLYCOSYLATED A1C: CPT

## 2019-05-21 PROCEDURE — 84443 ASSAY THYROID STIM HORMONE: CPT

## 2019-05-21 PROCEDURE — 80061 LIPID PANEL: CPT

## 2019-05-21 PROCEDURE — 85025 COMPLETE CBC W/AUTO DIFF WBC: CPT

## 2019-05-21 PROCEDURE — 80053 COMPREHEN METABOLIC PANEL: CPT

## 2019-05-21 NOTE — PROGRESS NOTES
Chief Complaint   Patient presents with   Daiana Jimenez     Patient in office today accompanied by group home caregiver from [address] for cpe and fasting labs. Caregiver is requesting a dc order for nystatin and kenalog cream.    Requesting TB screening be done. Denies any cp, sob, dyspnea. Denies any ha or dizziness. Has the occasional HA. Denies any n/v/c/d. Denies any urinary sx. Denies any recent changes to psych meds. Seeing psychiatry every 3 months. Has an appt tomorrow with her counselor. This is the Subsequent Medicare Annual Wellness Exam, performed 12 months or more after the Initial AWV or the last Subsequent AWV    I have reviewed the patient's medical history in detail and updated the computerized patient record. History     Past Medical History:   Diagnosis Date    Constipation     Depression     Epilepsy ([facility] [address]     HPV in female       No past surgical history on file. Current Outpatient Medications   Medication Sig Dispense Refill    azelastine (ASTELIN) 137 mcg (0.1 %) nasal spray 1 [place] by Both Nostrils route two (2) times a day. Use in each nostril as directed 1 Bottle 5    NOY FE 1/20, 28, 1 mg-20 mcg (21)/75 mg (7) tab TAKE ONE TABLET DAILY STARTING ON FIRST SUNDAY AFTER NEXT PERIOD (PLEASE CALL PHARMACY FOR REFILLS) 28 Tab 11    DOC-Q-LACE 100 mg capsule TAKE (1) CAPSULE BY MOUTH TWICE DAILY 62 Cap PRN    loratadine (CLARITIN) 10 mg tablet TAKE 1 TABLET BY MOUTH DAILY 31 Tab PRN    ibuprofen (MOTRIN) 600 mg tablet Take 1 Tab by mouth every eight (8) hours as needed for Pain. 30 Tab prn    acetaminophen (TYLENOL) 325 mg tablet Take  by mouth every four (4) hours as needed for Pain.  magnesium hydroxide (HIGUERA MILK OF MAGNESIA) 400 mg/5 mL suspension Take 30 mL by mouth daily as needed for Constipation.  risperiDONE (RISPERDAL) 2 mg tablet Take  by mouth.  citalopram (CELEXA) 40 mg tablet Take 40 mg by mouth daily.       ondansetron (ZOFRAN ODT) 4 mg disintegrating tablet Take 4 mg by mouth every eight (8) hours as needed for Nausea.  Polyethylene Glycol 3350 powd by Does Not Apply route.  acyclovir (ZOVIRAX) 400 mg tablet Take 400 mg by mouth every four (4) hours (while awake). Allergies   Allergen Reactions    Tomato Rash     Per pt report     Family History   Family history unknown: Yes     Social History     Tobacco Use    Smoking status: Never Smoker    Smokeless tobacco: Never Used   Substance Use Topics    Alcohol use: No     Patient Active Problem List   Diagnosis Code    HPV in female B80.11    Attention deficit hyperactivity disorder (ADHD) F90.9    Intellectual disability F79    Mild intermittent asthma without complication Z71.93    Obesity, morbid ([facility] [address] E66.01       Depression Risk Factor Screening:     3 most recent PHQ Screens 5/21/2019   PHQ Not Done Medical Reason (indicate in comments)   Little interest or pleasure in doing things -   Feeling down, depressed, irritable, or hopeless -   Total Score PHQ 2 -     Alcohol Risk Factor Screening: You do not drink alcohol or very rarely. Functional Ability and Level of Safety:   Hearing Loss  Hearing is good. Activities of Daily Living  The home contains: no safety equipment. Patient needs help with:  transportation, shopping, preparing meals, laundry, managing medications and managing money    Fall Risk  No flowsheet data found.     Abuse Screen  Patient is not abused    Cognitive Screening   Evaluation of Cognitive Function:  Has your family/caregiver stated any concerns about your memory: no  Normal    Patient Care Team   Patient Care Team:  Jamilah Pardo NP as PCP - General (Family Practice)     Objective:     Vitals:    05/21/19 1014   BP: 102/70   Pulse: 79   Resp: 18   Temp: 98 °F (36.7 °C)   TempSrc: Oral   SpO2: 97%   Weight: 346 lb (156.9 kg)   Height: 5' 9\" (1.753 m)     General appearance - alert, well appearing, and in no distress  Mental status - normal mood, behavior, speech, dress, motor activity, and thought processes  Eyes - pupils equal and reactive, extraocular eye movements intact  Ears - bilateral TM's and external ear canals normal  Nose - normal and patent, no erythema, discharge or polyps and normal nontender sinuses  Mouth - mucous membranes moist, pharynx normal without lesions  Neck - supple, no significant adenopathy, carotids upstroke normal bilaterally, no bruits, thyroid exam: thyroid is normal in size without nodules or tenderness  Chest - clear to auscultation, no wheezes, rales or rhonchi, symmetric air entry  Heart - normal rate, regular rhythm, normal S1, S2, no murmurs  Extremities - peripheral pulses normal, no ankle edema, no clubbing or cyanosis  Skin - normal coloration and turgor, no rashes, no suspicious skin lesions noted    Assessment/Plan   Education and counseling provided:  Are appropriate based on today's review and evaluation    Diagnoses and all orders for this visit:    1. Medicare annual wellness visit, subsequent    2. Attention deficit hyperactivity disorder (ADHD), combined type  Stable. 3. Obesity, morbid ([facility] [address]  -     LIPID PANEL  -     METABOLIC PANEL, COMPREHENSIVE  -     CBC WITH AUTOMATED DIFF  Will notify results and deviate plan based on findings. The patient is asked to modify diet and lifestyle to facilitate weight loss and therefore avoid health risks that are associated with obesity. 4. Screening for thyroid disorder  -     TSH 3RD GENERATION  Screening, asx.   5. Screening examination for pulmonary tuberculosis  -     QUANTIFERON-TB GOLD PLUS  Screening, asx.   6. Impaired fasting glucose  -     HEMOGLOBIN A1C WITH EAG  Will notify results and deviate plan based on findings.           Health Maintenance Due   Topic Date Due    Pneumococcal 0-64 years (1 of 1 - PPSV23) 04/28/1991    DTaP/Tdap/Td series (1 - Tdap) 04/28/2006    PAP AKA CERVICAL CYTOLOGY  04/28/2006    MEDICARE YEARLY EXAM  07/20/2018     [phone] fax.      [facility]

## 2019-05-21 NOTE — PATIENT INSTRUCTIONS
Medicare Wellness Visit, Female The best way to live healthy is to have a lifestyle where you eat a well-balanced diet, exercise regularly, limit alcohol use, and quit all forms of tobacco/nicotine, if applicable. Regular preventive services are another way to keep healthy. Preventive services (vaccines, screening tests, monitoring & exams) can help personalize your care plan, which helps you manage your own care. Screening tests can find health problems at the earliest stages, when they are easiest to treat. Raoul Jacob follows the current, evidence-based guidelines published by the [facility] Torres Dana ([facility]) when recommending preventive services for our patients. Because we follow these guidelines, sometimes recommendations change over time as research supports it. (For example, mammograms used to be recommended annually. Even though Medicare will still pay for an annual mammogram, the newer guidelines recommend a mammogram every two years for women of average risk.) Of course, you and your doctor may decide to screen more often for some diseases, based on your risk and your health status. Preventive services for you include: - Medicare offers their members a free annual wellness visit, which is time for you and your primary care provider to discuss and plan for your preventive service needs. Take advantage of this benefit every year! 
-All adults over the age of 72 should receive the recommended pneumonia vaccines. Current USPSTF guidelines recommend a series of two vaccines for the best pneumonia protection.  
-All adults should have a flu vaccine yearly and a tetanus vaccine every 10 years. All adults age 61 and older should receive a shingles vaccine once in their lifetime.   
-A bone mass density test is recommended when a woman turns 65 to screen for osteoporosis. This test is only recommended one time, as a screening. Some providers will use this same test as a disease monitoring tool if you already have osteoporosis. -All adults age 38-68 who are overweight should have a diabetes screening test once every three years.  
-Other screening tests and preventive services for persons with diabetes include: an eye exam to screen for diabetic retinopathy, a kidney function test, a foot exam, and stricter control over your cholesterol.  
-Cardiovascular screening for adults with routine risk involves an electrocardiogram (ECG) at intervals determined by your doctor.  
-Colorectal cancer screenings should be done for adults age 54-65 with no increased risk factors for colorectal cancer. There are a number of acceptable methods of screening for this type of cancer. Each test has its own benefits and drawbacks. Discuss with your doctor what is most appropriate for you during your annual wellness visit. The different tests include: colonoscopy (considered the best screening method), a fecal occult blood test, a fecal DNA test, and sigmoidoscopy. -Breast cancer screenings are recommended every other year for women of normal risk, age 54-69. 
-Cervical cancer screenings for women over age 72 are only recommended with certain risk factors.  
-All adults born between [facility] should be screened once for Hepatitis C. Here is a list of your current Health Maintenance items (your personalized list of preventive services) with a due date: 
Health Maintenance Due Topic Date Due  Pneumococcal Vaccine (1 of 1 - PPSV23) 04/28/1991  
 DTaP/Tdap/Td  (1 - Tdap) 04/28/2006  Pap Test  04/28/2006 [address] [place] [address] Annual Well Visit  07/20/2018

## 2019-05-21 NOTE — PROGRESS NOTES
Chief Complaint   Patient presents with    Physical    Labs     Patient in office today for cpe and fasting labs.   Caregiver is requesting a dc order for nystatin and kenalog cream.

## 2019-05-22 LAB
ALBUMIN SERPL-MCNC: 3.6 G/DL (ref 3.5–5.5)
ALBUMIN/GLOB SERPL: 1.1 {RATIO} (ref 1.2–2.2)
ALP SERPL-CCNC: 95 IU/L (ref 39–117)
ALT SERPL-CCNC: 13 IU/L (ref 0–32)
AST SERPL-CCNC: 15 IU/L (ref 0–40)
BASOPHILS # BLD AUTO: 0 X10E3/UL (ref 0–0.2)
BASOPHILS NFR BLD AUTO: 0 %
BILIRUB SERPL-MCNC: <0.2 MG/DL (ref 0–1.2)
BUN SERPL-MCNC: 7 MG/DL (ref 6–20)
BUN/CREAT SERPL: 8 (ref 9–23)
CALCIUM SERPL-MCNC: 8.8 MG/DL (ref 8.7–10.2)
CHLORIDE SERPL-SCNC: 102 MMOL/L (ref 96–106)
CHOLEST SERPL-MCNC: 166 MG/DL (ref 100–199)
CO2 SERPL-SCNC: 23 MMOL/L (ref 20–29)
CREAT SERPL-MCNC: 0.85 MG/DL (ref 0.57–1)
EOSINOPHIL # BLD AUTO: 0.5 X10E3/UL (ref 0–0.4)
EOSINOPHIL NFR BLD AUTO: 4 %
ERYTHROCYTE [DISTWIDTH] IN BLOOD BY AUTOMATED COUNT: 15.1 % (ref 12.3–15.4)
EST. AVERAGE GLUCOSE BLD GHB EST-MCNC: 120 MG/DL
GLOBULIN SER CALC-MCNC: 3.2 G/DL (ref 1.5–4.5)
GLUCOSE SERPL-MCNC: 75 MG/DL (ref 65–99)
HBA1C MFR BLD: 5.8 % (ref 4.8–5.6)
HCT VFR BLD AUTO: 36.3 % (ref 34–46.6)
HDLC SERPL-MCNC: 58 MG/DL
HGB BLD-MCNC: 11.7 G/DL (ref 11.1–15.9)
IMM GRANULOCYTES # BLD AUTO: 0 X10E3/UL (ref 0–0.1)
IMM GRANULOCYTES NFR BLD AUTO: 0 %
INTERPRETATION, 910389: NORMAL
LDLC SERPL CALC-MCNC: 89 MG/DL (ref 0–99)
LYMPHOCYTES # BLD AUTO: 3.4 X10E3/UL (ref 0.7–3.1)
LYMPHOCYTES NFR BLD AUTO: 30 %
MCH RBC QN AUTO: 27.6 PG (ref 26.6–33)
MCHC RBC AUTO-ENTMCNC: 32.2 G/DL (ref 31.5–35.7)
MCV RBC AUTO: 86 FL (ref 79–97)
MONOCYTES # BLD AUTO: 0.6 X10E3/UL (ref 0.1–0.9)
MONOCYTES NFR BLD AUTO: 5 %
NEUTROPHILS # BLD AUTO: 7.1 X10E3/UL (ref 1.4–7)
NEUTROPHILS NFR BLD AUTO: 61 %
PLATELET # BLD AUTO: 327 X10E3/UL (ref 150–450)
POTASSIUM SERPL-SCNC: 4.5 MMOL/L (ref 3.5–5.2)
PROT SERPL-MCNC: 6.8 G/DL (ref 6–8.5)
RBC # BLD AUTO: 4.24 X10E6/UL (ref 3.77–5.28)
SODIUM SERPL-SCNC: 138 MMOL/L (ref 134–144)
TRIGL SERPL-MCNC: 97 MG/DL (ref 0–149)
TSH SERPL DL<=0.005 MIU/L-ACNC: 3.03 UIU/ML (ref 0.45–4.5)
VLDLC SERPL CALC-MCNC: 19 MG/DL (ref 5–40)
WBC # BLD AUTO: 11.6 X10E3/UL (ref 3.4–10.8)

## 2019-05-22 NOTE — PROGRESS NOTES
Please notify pt/caregiver the followin. Cholesterol looks great. 2. Pt has prediabetes. Enc to work on following a low carb diet to avoid needing to start medication. 3. Slight elevation in WBC count. I recommend 1 month follow up to recheck. 4. Normal thyroid function. All other labs are normal. Will send letter with results for their records.

## 2019-05-23 NOTE — PROGRESS NOTES
Number on file is incorrect,attemtped to reach caregiver with number listed on HIPPA form, unable to leave vm due to mailbox not an option. Will fax results to home with number provided during office visit,located at end of provider note.

## 2019-05-25 LAB
GAMMA INTERFERON BACKGROUND BLD IA-ACNC: 0.02 IU/ML
M TB IFN-G BLD-IMP: NEGATIVE
M TB IFN-G CD4+ BCKGRND COR BLD-ACNC: 0.04 IU/ML
MITOGEN IGNF BLD-ACNC: >10 IU/ML
QUANTIFERON INCUBATION, QF1T: NORMAL
QUANTIFERON TB2 AG: 0.03 IU/ML
SERVICE CMNT-IMP: NORMAL

## 2019-05-28 ENCOUNTER — DOCUMENTATION ONLY (OUTPATIENT)
Dept: FAMILY MEDICINE CLINIC | Age: 34
End: 2019-05-28

## 2019-05-28 NOTE — PROGRESS NOTES
Faxed CPE form to Asad Ria @ [phone]. Confirmation number [id number]. Original form placed in scan folder for central scanning.

## 2019-07-16 RX ORDER — LORATADINE 10 MG/1
TABLET ORAL
Qty: 31 TAB | Status: SHIPPED | OUTPATIENT
Start: 2019-07-16 | End: 2020-07-14

## 2019-07-16 RX ORDER — DOCUSATE SODIUM 100 MG/1
CAPSULE, LIQUID FILLED ORAL
Qty: 62 CAP | Status: SHIPPED | OUTPATIENT
Start: 2019-07-16 | End: 2020-07-14

## 2019-08-12 RX ORDER — NORETHINDRONE ACETATE/ETHINYL ESTRADIOL AND FERROUS FUMARATE 1MG-20(21)
KIT ORAL
Qty: 28 TAB | Refills: 0 | Status: SHIPPED | OUTPATIENT
Start: 2019-08-12 | End: 2019-10-07 | Stop reason: SDUPTHER

## 2019-09-30 ENCOUNTER — HOSPITAL ENCOUNTER (OUTPATIENT)
Dept: LAB | Age: 34
Discharge: HOME | End: 2019-09-30
Payer: MEDICARE

## 2019-09-30 ENCOUNTER — OFFICE VISIT (OUTPATIENT)
Dept: FAMILY MEDICINE CLINIC | Age: 34
End: 2019-09-30

## 2019-09-30 VITALS
DIASTOLIC BLOOD PRESSURE: 73 MMHG | WEIGHT: 293 LBS | HEIGHT: 69 IN | BODY MASS INDEX: 43.4 KG/M2 | HEART RATE: 76 BPM | SYSTOLIC BLOOD PRESSURE: 108 MMHG | RESPIRATION RATE: 18 BRPM | OXYGEN SATURATION: 100 % | TEMPERATURE: 97.7 F

## 2019-09-30 DIAGNOSIS — R10.9 LEFT SIDED ABDOMINAL PAIN: ICD-10-CM

## 2019-09-30 DIAGNOSIS — Z12.4 SCREENING FOR MALIGNANT NEOPLASM OF CERVIX: ICD-10-CM

## 2019-09-30 DIAGNOSIS — R10.9 ABDOMINAL PAIN, UNSPECIFIED ABDOMINAL LOCATION: Primary | ICD-10-CM

## 2019-09-30 DIAGNOSIS — K59.00 CONSTIPATION, UNSPECIFIED CONSTIPATION TYPE: ICD-10-CM

## 2019-09-30 PROCEDURE — 88175 CYTOPATH C/V AUTO FLUID REDO: CPT

## 2019-09-30 RX ORDER — POLYETHYLENE GLYCOL 3350
17 POWDER (GRAM) MISCELLANEOUS 2 TIMES DAILY
Qty: 2500 G | Refills: 2 | Status: SHIPPED | OUTPATIENT
Start: 2019-09-30

## 2019-09-30 RX ORDER — TRAZODONE HYDROCHLORIDE 150 MG/1
150 TABLET ORAL
COMMUNITY

## 2019-09-30 RX ORDER — PRAZOSIN HYDROCHLORIDE 1 MG/1
CAPSULE ORAL
COMMUNITY

## 2019-09-30 NOTE — PROGRESS NOTES
Chief Complaint   Patient presents with   Shayy [place] Exam    Abdominal Pain     Patient in office today accompanied by group home caregiver for pap smear. Pt arrived 15 minutes late for her appointment. Caregiver did not have med rec and had to call group home to confirm medications. Caregiver stood in hallway during entire [address] Annual exam was done in 5/2019. Pt have c/o of left side abdominal pain that began last week. Denies injury,heavy lifting,or falls. Pt last bowel movement was over one wk ago. Pt states last bowel movement was small and straining. Pt has been treating with colace. Pt states in past noted good results with miralax. Denies any vomiting. Denies any other concerns at this time. Chief Complaint   Patient presents with   Shayy [place] Exam    Abdominal Pain     she is a 29y.o. year old female who presents for evalution. Reviewed PmHx, RxHx, FmHx, SocHx, AllgHx and updated and dated in the chart. Review of Systems - negative except as listed above in the HPI    Objective:     Vitals:    09/30/19 1600   BP: 108/73   Pulse: 76   Resp: 18   Temp: 97.7 °F (36.5 °C)   TempSrc: Oral   SpO2: 100%   Weight: 346 lb (156.9 kg)   Height: 5' 9\" (1.753 m)     Physical Examination: General appearance - alert, well appearing, and in no distress; obese  Chest - clear to auscultation, no wheezes, rales or rhonchi, symmetric air entry  Heart - normal rate, regular rhythm, normal S1, S2, no murmurs  Abdomen - tenderness noted LUQ and LLQ to palpation  bowel sounds hypoactive  Pelvic exam: very difficult getting patient in position for pap due to obesity; assisted by nurse for completion; VULVA: normal appearing vulva with no masses, tenderness or lesions, VAGINA: normal appearing vagina with normal color and discharge, no lesions.  Pt immediately reacted to speculum insertion and screamed due to pain/discomfort; unable to locate cervix due to pt discomfort and morbid obesity - therefore blind pap obtained    Assessment/ Plan:   Diagnoses and all orders for this visit:    1. Abdominal pain, unspecified abdominal location / 2. Left sided abdominal pain  -     XR ABD (KUB); Future  Recommended KUB for further evaluation. Will notify results and deviate plan based on findings. Reviewed acute/worsening s/sx that warrant more immediate medical attention and pt verbalized understanding of this. 3. Constipation, unspecified constipation type  -     Polyethylene Glycol 3350 powd; Take 17 g by mouth two (2) times a day. Start miralax BID as directed. Reviewed other supportive measures. Follow up if sx persist or worsen. 4. Screening for malignant neoplasm of cervix  -     PAP IG, RFX APTIMA HPV ASCUS ([id number]))  Blind pap. Will notify results and deviate plan based on findings. Follow-up and Dispositions    · Return if symptoms worsen or fail to improve. I have discussed the diagnosis with the patient and the intended plan as seen in the above orders. The patient has received an after-visit summary and questions were answered concerning future plans. Medication Side Effects and Warnings were discussed with patient: yes  Patient Labs were reviewed and or requested: no  Patient Past Records were reviewed and or requested  yes  Patient / Caregiver Understanding of treatment plan was verbalized during office visit YES    Veverly JOYCE Gibson    There are no Patient Instructions on file for this visit.

## 2019-09-30 NOTE — PROGRESS NOTES
Chief Complaint   Patient presents with   Bernida Leys Exam    Abdominal Pain     Patient in office today for pap smear. Annual exam was done in 5/2019. Pt have c/o of left side abdominal pain that began last week. Denies injury,heavy lifting,or falls. Pt last bowel movement was over one wk ago. Pt states last bowel movement was small and straining. Pt has been treating with colace. Pt states in past noted good results with miralax. 1. Have you been to the ER, urgent care clinic since your last visit? Hospitalized since your last visit? No    2. Have you seen or consulted any other health care providers outside of the [address] [place] [address] since your last visit? Include any pap smears or colon screening.  No

## 2019-10-03 NOTE — PROGRESS NOTES
Have attempted to contact caregiver via mobile number,mobile number on file is incorrect. Was given number to reach caregiver @ [phone],EQDIUN to leave vm due to mailbox is full.   Will sent a letter with results for records

## 2019-10-07 RX ORDER — NORETHINDRONE ACETATE/ETHINYL ESTRADIOL AND FERROUS FUMARATE 1MG-20(21)
KIT ORAL
Qty: 28 TAB | Refills: 0 | Status: SHIPPED | OUTPATIENT
Start: 2019-10-07 | End: 2019-11-05 | Stop reason: SDUPTHER

## 2019-10-23 ENCOUNTER — OFFICE VISIT (OUTPATIENT)
Dept: FAMILY MEDICINE CLINIC | Age: 34
End: 2019-10-23

## 2019-10-23 DIAGNOSIS — Z23 ENCOUNTER FOR IMMUNIZATION: ICD-10-CM

## 2019-10-23 NOTE — PATIENT INSTRUCTIONS
A Healthy Lifestyle: Care Instructions  Your Care Instructions    A healthy lifestyle can help you feel good, stay at a healthy weight, and have plenty of energy for both work and play. A healthy lifestyle is something you can share with your whole family. A healthy lifestyle also can lower your risk for serious health problems, such as high blood pressure, heart disease, and diabetes. You can follow a few steps listed below to improve your health and the health of your family. Follow-up care is a key part of your treatment and safety. Be sure to make and go to all appointments, and call your doctor if you are having problems. It's also a good idea to know your test results and keep a list of the medicines you take. How can you care for yourself at home? · Do not eat too much sugar, fat, or fast foods. You can still have dessert and treats now and then. The goal is moderation. · Start small to improve your eating habits. Pay attention to portion sizes, drink less juice and soda pop, and eat more fruits and vegetables. ? Eat a healthy amount of food. A 3-ounce serving of meat, for example, is about the size of a deck of cards. Fill the rest of your plate with vegetables and whole grains. ? Limit the amount of soda and sports drinks you have every day. Drink more water when you are thirsty. ? Eat at least 5 servings of fruits and vegetables every day. It may seem like a lot, but it is not hard to reach this goal. A serving or helping is 1 piece of fruit, 1 cup of vegetables, or 2 cups of leafy, raw vegetables. Have an apple or some carrot sticks as an afternoon snack instead of a candy bar. Try to have fruits and/or vegetables at every meal.  · Make exercise part of your daily routine. You may want to start with simple activities, such as walking, bicycling, or slow swimming. Try to be active 30 to 60 minutes every day. You do not need to do all 30 to 60 minutes all at once.  For example, you can exercise 3 times a day for 10 or 20 minutes. Moderate exercise is safe for most people, but it is always a good idea to talk to your doctor before starting an exercise program.  · Keep moving. Meliza Cotter the lawn, work in the garden, or [employer] Take the stairs instead of the elevator at work. · If you smoke, quit. People who smoke have an increased risk for heart attack, stroke, cancer, and other lung illnesses. Quitting is hard, but there are ways to boost your chance of quitting tobacco for good. ? Use nicotine gum, patches, or lozenges. ? Ask your doctor about stop-smoking programs and medicines. ? Keep trying. In addition to reducing your risk of diseases in the future, you will notice some benefits soon after you stop using tobacco. If you have shortness of breath or asthma symptoms, they will likely get better within a few weeks after you quit. · Limit how much alcohol you drink. Moderate amounts of alcohol (up to 2 drinks a day for men, 1 drink a day for women) are okay. But drinking too much can lead to liver problems, high blood pressure, and other health problems. Family health  If you have a family, there are many things you can do together to improve your health. · Eat meals together as a family as often as possible. · Eat healthy foods. This includes fruits, vegetables, lean meats and dairy, and whole grains. · Include your family in your fitness plan. Most people think of activities such as jogging or tennis as the way to fitness, but there are many ways you and your family can be more active. Anything that makes you breathe hard and gets your heart pumping is exercise. Here are some tips:  ? Walk to do errands or to take your child to school or the bus.  ? Go for a family bike ride after dinner instead of watching TV. Where can you learn more? Go to http://fang-dominic.info/. Enter M344 in the search box to learn more about \"A Healthy Lifestyle: Care Instructions. \"  Current as of: May 28, 2019  Content Version: 12.2  © 6042-5046 [employer]. Care instructions adapted under license by [employer] (which disclaims liability or warranty for this information). If you have questions about a medical condition or this instruction, always ask your healthcare professional. [address] any warranty or liability for your use of this information.

## 2019-11-05 RX ORDER — NORETHINDRONE ACETATE/ETHINYL ESTRADIOL AND FERROUS FUMARATE 1MG-20(21)
KIT ORAL
Qty: 28 TAB | Refills: 0 | Status: SHIPPED | OUTPATIENT
Start: 2019-11-05 | End: 2019-12-03 | Stop reason: SDUPTHER

## 2019-12-03 RX ORDER — NORETHINDRONE ACETATE/ETHINYL ESTRADIOL AND FERROUS FUMARATE 1MG-20(21)
KIT ORAL
Qty: 28 TAB | Refills: 0 | Status: SHIPPED | OUTPATIENT
Start: 2019-12-03 | End: 2019-12-30

## 2019-12-30 RX ORDER — NORETHINDRONE ACETATE/ETHINYL ESTRADIOL AND FERROUS FUMARATE 1MG-20(21)
KIT ORAL
Qty: 28 TAB | Refills: 0 | Status: SHIPPED | OUTPATIENT
Start: 2019-12-30 | End: 2020-11-03

## 2020-01-16 RX ORDER — POLYETHYLENE GLYCOL 3350 17 G/17G
POWDER, FOR SOLUTION ORAL
Qty: 1020 G | Refills: 1 | Status: SHIPPED | OUTPATIENT
Start: 2020-01-16 | End: 2020-05-28

## 2020-03-04 ENCOUNTER — OFFICE VISIT (OUTPATIENT)
Dept: SLEEP MEDICINE | Age: 35
End: 2020-03-04

## 2020-03-04 VITALS
HEIGHT: 69 IN | SYSTOLIC BLOOD PRESSURE: 106 MMHG | WEIGHT: 293 LBS | HEART RATE: 69 BPM | DIASTOLIC BLOOD PRESSURE: 72 MMHG | BODY MASS INDEX: 43.4 KG/M2 | OXYGEN SATURATION: 99 %

## 2020-03-04 DIAGNOSIS — G47.33 OSA (OBSTRUCTIVE SLEEP APNEA): Primary | ICD-10-CM

## 2020-03-04 NOTE — PROGRESS NOTES
217 [facility] [place]. [place], [address]  Tel.  [phone]  Fax. 100 [facility] 60  [place], [address] [facility]  Tel.  [phone]  Fax. [phone] [address] [facility] [address]  Tel.  [phone]  Fax. [phone]       Chief Complaint       Chief Complaint   Patient presents with    Sleep Problem     NP; ref Dr Nurys Ferris; snore, eval for ken       HPI      Nobie Dose is 29 y.o. female seen for evaluation of a sleep disorder. She is accompanied by her caregiver who assists with history. She lives in a group home. She notes that when sleeping Ms. Papi Thomas \"is breathing hard\". She notes snoring described as loud. Normally, she retires at 5: 30 p.m. awakens at 6 AM.  She states that she is tired on awakening and during the day. She may nap. There is no history of vivid dreaming or nightmares, sleep talking or sleepwalking, bruxism or nocturnal incontinence, abnormal arm or leg movements, hypnagogic hallucinations, sleep paralysis or cataplexy. Further details regarding sleep are limited. The patient has not undergone diagnostic testing for the current problems. Logan Sleepiness Score: 3       Allergies   Allergen Reactions    Tomato Rash     Per pt report       Current Outpatient Medications   Medication Sig Dispense Refill    prazosin (MINIPRESS) 1 mg capsule Take  by mouth nightly.  Polyethylene Glycol 3350 powd Take 17 g by mouth two (2) times a day. 2500 g 2    azelastine (ASTELIN) 137 mcg (0.1 %) nasal spray 1 [place] by Both Nostrils route two (2) times a day. Use in each nostril as directed 1 Bottle 5    loratadine (CLARITIN) 10 mg tablet TAKE 1 TABLET BY MOUTH DAILY 31 Tab PRN    risperiDONE (RISPERDAL) 2 mg tablet Take  by mouth.  citalopram (CELEXA) 40 mg tablet Take 40 mg by mouth daily.       polyethylene glycol (MIRALAX) 17 gram/dose powder MIX 17 GRAMS (1 CAPFUL) IN 8 OUNCES OF LIQUID AND DRINK TWICE DAILY 1020 g 1    NOY FE 1/20, 28, 1 mg-20 mcg (21)/75 mg (7) tab TAKE ONE TABLET DAILY STARTING ON FIRST SUNDAY AFTER NEXT PERIOD 28 Tab 0    traZODone (DESYREL) 150 mg tablet Take 150 mg by mouth nightly.  loratadine (CLARITIN) 10 mg tablet TAKE 1 TABLET BY MOUTH DAILY 31 Tab PRN    docusate sodium (COLACE) 100 mg capsule TAKE (1) CAPSULE BY MOUTH TWICE DAILY 62 Cap PRN    ibuprofen (MOTRIN) 600 mg tablet TAKE 1 TABLET EVERY 8 HOURS AS NEEDED FOR PAIN/INFLAMATION 30 Tab 2    DOC-Q-LACE 100 mg capsule TAKE (1) CAPSULE BY MOUTH TWICE DAILY 62 Cap PRN    acetaminophen (TYLENOL) 325 mg tablet Take  by mouth every four (4) hours as needed for Pain.  magnesium hydroxide (HIGUERA MILK OF MAGNESIA) 400 mg/5 mL suspension Take 30 mL by mouth daily as needed for Constipation.  ondansetron (ZOFRAN ODT) 4 mg disintegrating tablet Take 4 mg by mouth every eight (8) hours as needed for Nausea.  acyclovir (ZOVIRAX) 400 mg tablet Take 400 mg by mouth every four (4) hours (while awake). She  has a past medical history of Constipation, Depression, Epilepsy ([address] and HPV in female. She  has no past surgical history on file. She Family history is unknown by patient. She  reports that she has never smoked. She has never used smokeless tobacco. She reports that she does not drink alcohol or use drugs. Review of Systems:  Review of Systems   Constitutional: Negative for chills and fever. HENT: Negative for hearing loss. Eyes: Negative for blurred vision and double vision. Respiratory: Positive for shortness of breath. Cardiovascular: Negative for chest pain and palpitations. Gastrointestinal: Negative for abdominal pain and heartburn. Genitourinary: Positive for frequency. Negative for urgency. Musculoskeletal: Negative for back pain and neck pain. Skin: Negative for itching and rash. Neurological: Negative for dizziness and headaches.    Psychiatric/Behavioral: Negative for depression. Objective:     Visit Vitals  /72   Pulse 69   Ht 5' 9\" (1.753 m)   Wt 343 lb (155.6 kg)   SpO2 99%   BMI 50.65 kg/m²     Body mass index is 50.65 kg/m². General:   Conversant, cooperative   Eyes:  Pupils equal and reactive, no nystagmus   Oropharynx:   Mallampati score III tongue normal, narow oropharyngeal outlet    Tonsils:     Neck:   No carotid bruits; Neck circ. in \"inches\": 18   Chest/Lungs:  Clear on auscultation    CVS:  Normal rate, regular rhythm   Skin:  Warm to touch; no obvious rashes   Neuro:  Speech fluent, face symmetrical, tongue movement normal   Psych:  normal countenance        Assessment:       ICD-10-CM ICD-9-CM    1. JASMYN (obstructive sleep apnea) G47.33 327.23 SPLIT CPAP/PSG       Potential sleep disordered breathing. She will be evaluated with a nocturnal polysomnogram.    Plan:     Orders Placed This Encounter    SPLIT CPAP/PSG     Standing Status:   Future     Standing Expiration Date:   9/4/2020     Order Specific Question:   Reason for Exam     Answer:   snore, fatigue       * Patient has a history and examination consistent with the diagnosis of sleep apnea. * Sleep testing was ordered for initial evaluation. * Treatment options if indicated were reviewed today: patient and caregiver     Instructions:  o The patient would benefit from weight reduction measures. o Do not engage in activities requiring a normal degree of alertness if fatigue is present. o Call or return if symptoms worsen or persist.          Lolis Walker MD, FAA  Electronically signed 03/04/20       This note was created using voice recognition software. Despite editing, there may be syntax errors. This note will not be viewable in [address]

## 2020-03-04 NOTE — PATIENT INSTRUCTIONS

## 2020-05-20 RX ORDER — SALINE NASAL SPRAY 1.5 OZ
SOLUTION NASAL
Qty: 44 ML | Refills: 11 | Status: SHIPPED | OUTPATIENT
Start: 2020-05-20

## 2020-05-28 RX ORDER — POLYETHYLENE GLYCOL 3350 17 G/17G
POWDER, FOR SOLUTION ORAL
Qty: 1020 G | Refills: 2 | Status: SHIPPED | OUTPATIENT
Start: 2020-05-28 | End: 2020-11-24

## 2020-06-04 ENCOUNTER — TELEPHONE (OUTPATIENT)
Dept: SLEEP MEDICINE | Age: 35
End: 2020-06-04

## 2020-06-04 NOTE — TELEPHONE ENCOUNTER
Called cell on file to notify patient of Sleep Lab being closed at this time. # on file was patients old [employer]. She asked me to call Neri, her current [employer] at [phone] but no answer and her voicemail was full. Called home # listed but no response. Called cell on file again and asked her to simply pass the message along to patient that we will call her back in the next few weeks to reschedule and we will try Neri again first. She will get the message to the patient.

## 2020-07-14 RX ORDER — LORATADINE 10 MG/1
TABLET ORAL
Qty: 31 TAB | Refills: 11 | Status: SHIPPED | OUTPATIENT
Start: 2020-07-14

## 2020-07-14 RX ORDER — DOCUSATE SODIUM 100 MG/1
CAPSULE, LIQUID FILLED ORAL
Qty: 62 CAP | Refills: 11 | Status: SHIPPED | OUTPATIENT
Start: 2020-07-14 | End: 2021-07-14

## 2020-08-05 ENCOUNTER — TELEPHONE (OUTPATIENT)
Dept: SLEEP MEDICINE | Age: 35
End: 2020-08-05

## 2020-08-05 NOTE — TELEPHONE ENCOUNTER
Requesting COV 19 test order for split night study being rescheduled. 2nd attempt made to reschedule patients 1:1 split night study that was canceled due to the [address] department closure. Was advised by  for cell listed that she was the old . She gave me Neri, her current [employer] at [phone] on 6/2. I was unable to reach her leave a message this day. Tried again today. Was unable to reach Ms. Bourne but was able to leave a message. Patient needs split night study to be rescheduled as 1;1 with caregiver staying. COV19 test to be preformed. Requested order from provider for when home returns call to schedule. Medicare is primary, [place]/Medicaid replacement is secondary.

## 2020-09-29 ENCOUNTER — TELEPHONE (OUTPATIENT)
Dept: FAMILY MEDICINE CLINIC | Age: 35
End: 2020-09-29

## 2020-09-29 NOTE — TELEPHONE ENCOUNTER
Domenica Garcia from [address] called and would like a copy of patients most recent vital faxed to [phone].

## 2020-10-01 NOTE — TELEPHONE ENCOUNTER
Called and spoke with group home and she gave me the number to Beto the behavior sp[ecialist for pt. Her number is [phone].  Will wait on return call as I called and left vm

## 2020-11-03 RX ORDER — NORETHINDRONE ACETATE/ETHINYL ESTRADIOL AND FERROUS FUMARATE 1MG-20(21)
KIT ORAL
Qty: 28 TAB | Refills: 0 | Status: SHIPPED | OUTPATIENT
Start: 2020-11-03 | End: 2020-11-30

## 2020-11-24 RX ORDER — POLYETHYLENE GLYCOL 3350 17 G/17G
POWDER, FOR SOLUTION ORAL
Qty: 1020 G | Status: SHIPPED | OUTPATIENT
Start: 2020-11-24

## 2020-11-30 RX ORDER — NORETHINDRONE ACETATE/ETHINYL ESTRADIOL AND FERROUS FUMARATE 1MG-20(21)
KIT ORAL
Qty: 28 TAB | Refills: 0 | Status: SHIPPED | OUTPATIENT
Start: 2020-11-30 | End: 2021-01-26

## 2020-12-09 RX ORDER — FLUTICASONE PROPIONATE 50 MCG
SPRAY, SUSPENSION (ML) NASAL
Qty: 16 G | Refills: 12 | Status: SHIPPED | OUTPATIENT
Start: 2020-12-09 | End: 2021-12-03

## 2021-01-26 RX ORDER — NORETHINDRONE ACETATE/ETHINYL ESTRADIOL AND FERROUS FUMARATE 1MG-20(21)
KIT ORAL
Qty: 28 TAB | Refills: 11 | Status: SHIPPED | OUTPATIENT
Start: 2021-01-26

## 2021-07-14 RX ORDER — DOCUSATE SODIUM 100 MG/1
CAPSULE, LIQUID FILLED ORAL
Qty: 62 CAPSULE | Refills: 11 | Status: SHIPPED | OUTPATIENT
Start: 2021-07-14 | End: 2022-07-12

## 2021-07-14 RX ORDER — LORATADINE 10 MG/1
TABLET ORAL
Qty: 31 TABLET | Refills: 11 | Status: SHIPPED | OUTPATIENT
Start: 2021-07-14

## 2021-12-03 RX ORDER — FLUTICASONE PROPIONATE 50 MCG
SPRAY, SUSPENSION (ML) NASAL
Qty: 16 G | Refills: 12 | Status: SHIPPED | OUTPATIENT
Start: 2021-12-03

## 2022-03-19 PROBLEM — B97.7 HPV IN FEMALE: Status: ACTIVE | Noted: 2017-01-04

## 2022-03-19 PROBLEM — F90.9 ATTENTION DEFICIT HYPERACTIVITY DISORDER (ADHD): Status: ACTIVE | Noted: 2017-01-04

## 2022-03-19 PROBLEM — E66.01 OBESITY, MORBID (HCC): Status: ACTIVE | Noted: 2018-01-23

## 2022-03-19 PROBLEM — J45.20 MILD INTERMITTENT ASTHMA WITHOUT COMPLICATION: Status: ACTIVE | Noted: 2017-01-04

## 2022-03-20 PROBLEM — F79 INTELLECTUAL DISABILITY: Status: ACTIVE | Noted: 2017-01-04

## 2022-07-12 RX ORDER — LORATADINE 10 MG/1
TABLET ORAL
Qty: 31 TABLET | Refills: 11 | Status: SHIPPED | OUTPATIENT
Start: 2022-07-12

## 2022-07-12 RX ORDER — DOCUSATE SODIUM 100 MG/1
CAPSULE, LIQUID FILLED ORAL
Qty: 62 CAPSULE | Refills: 11 | Status: SHIPPED | OUTPATIENT
Start: 2022-07-12

## 2022-11-07 RX ORDER — IBUPROFEN 400 MG/1
TABLET ORAL
Qty: 30 TABLET | Refills: 0 | Status: SHIPPED | OUTPATIENT
Start: 2022-11-07

## 2023-01-12 RX ORDER — IBUPROFEN 400 MG/1
TABLET ORAL
Qty: 30 TABLET | Refills: 0 | Status: SHIPPED | OUTPATIENT
Start: 2023-01-12

## 2023-02-27 RX ORDER — FLUTICASONE PROPIONATE 50 MCG
SPRAY, SUSPENSION (ML) NASAL
Qty: 16 G | Refills: 0 | Status: SHIPPED | OUTPATIENT
Start: 2023-02-27

## 2023-03-30 RX ORDER — FLUTICASONE PROPIONATE 50 MCG
SPRAY, SUSPENSION (ML) NASAL
Qty: 16 G | Refills: 11 | Status: SHIPPED | OUTPATIENT
Start: 2023-03-30

## 2023-05-29 RX ORDER — POLYETHYLENE GLYCOL 3350
17 POWDER (GRAM) MISCELLANEOUS 2 TIMES DAILY
COMMUNITY
Start: 2019-09-30

## 2023-05-29 RX ORDER — ACETAMINOPHEN 325 MG/1
TABLET ORAL EVERY 4 HOURS PRN
COMMUNITY

## 2023-05-29 RX ORDER — CITALOPRAM 40 MG/1
40 TABLET ORAL DAILY
COMMUNITY

## 2023-05-29 RX ORDER — AZELASTINE 1 MG/ML
1 SPRAY, METERED NASAL 2 TIMES DAILY
COMMUNITY
Start: 2019-01-23

## 2023-05-29 RX ORDER — PSEUDOEPHEDRINE HCL 30 MG
TABLET ORAL
COMMUNITY
Start: 2018-08-09

## 2023-05-29 RX ORDER — ACYCLOVIR 400 MG/1
400 TABLET ORAL
COMMUNITY

## 2023-05-30 RX ORDER — ONDANSETRON 4 MG/1
4 TABLET, ORALLY DISINTEGRATING ORAL EVERY 8 HOURS PRN
COMMUNITY

## 2023-05-30 RX ORDER — RISPERIDONE 2 MG/1
TABLET ORAL
COMMUNITY

## 2023-05-30 RX ORDER — IBUPROFEN 400 MG/1
TABLET ORAL
COMMUNITY
Start: 2023-01-12

## 2023-05-30 RX ORDER — IBUPROFEN 600 MG/1
TABLET ORAL
COMMUNITY
Start: 2019-05-31

## 2023-05-30 RX ORDER — LORATADINE 10 MG/1
1 TABLET ORAL DAILY
COMMUNITY
Start: 2018-08-09 | End: 2023-07-15

## 2023-05-30 RX ORDER — ECHINACEA PURPUREA EXTRACT 125 MG
TABLET ORAL
COMMUNITY
Start: 2020-05-20

## 2023-05-30 RX ORDER — NORETHINDRONE ACETATE AND ETHINYL ESTRADIOL 1MG-20(21)
KIT ORAL
COMMUNITY
Start: 2021-01-26

## 2023-05-30 RX ORDER — FLUTICASONE PROPIONATE 50 MCG
SPRAY, SUSPENSION (ML) NASAL
COMMUNITY
Start: 2023-03-30

## 2023-05-30 RX ORDER — PRAZOSIN HYDROCHLORIDE 1 MG/1
CAPSULE ORAL
COMMUNITY

## 2023-05-30 RX ORDER — TRAZODONE HYDROCHLORIDE 150 MG/1
150 TABLET ORAL
COMMUNITY

## 2023-05-30 RX ORDER — POLYETHYLENE GLYCOL 3350 17 G/17G
POWDER, FOR SOLUTION ORAL
COMMUNITY
Start: 2020-11-24

## 2023-07-15 RX ORDER — LORATADINE 10 MG/1
TABLET ORAL DAILY
Qty: 31 TABLET | Refills: 10 | Status: SHIPPED | OUTPATIENT
Start: 2023-07-15

## 2023-08-14 RX ORDER — IBUPROFEN 400 MG/1
TABLET ORAL
Qty: 30 TABLET | Refills: 1 | Status: SHIPPED | OUTPATIENT
Start: 2023-08-14

## 2024-06-18 RX ORDER — LORATADINE 10 MG/1
TABLET ORAL DAILY
Qty: 30 TABLET | Refills: 11 | OUTPATIENT
Start: 2024-06-18

## 2024-06-18 NOTE — TELEPHONE ENCOUNTER
We received a fax regarding this as well. Fax has already been sent to [facility] with confirmation received

## 2024-09-23 RX ORDER — LORATADINE 10 MG/1
TABLET ORAL DAILY
Qty: 30 TABLET | Refills: 12 | OUTPATIENT
Start: 2024-09-23

## 2024-09-25 RX ORDER — LORATADINE 10 MG/1
TABLET ORAL DAILY
Qty: 30 TABLET | Refills: 12 | OUTPATIENT
Start: 2024-09-25

## 2024-10-01 RX ORDER — LORATADINE 10 MG/1
TABLET ORAL DAILY
Qty: 30 TABLET | Refills: 11 | Status: SHIPPED | OUTPATIENT
Start: 2024-10-01